# Patient Record
Sex: FEMALE | Race: WHITE | Employment: UNEMPLOYED | URBAN - NONMETROPOLITAN AREA
[De-identification: names, ages, dates, MRNs, and addresses within clinical notes are randomized per-mention and may not be internally consistent; named-entity substitution may affect disease eponyms.]

---

## 2017-03-03 ENCOUNTER — OFFICE VISIT - GICH (OUTPATIENT)
Dept: FAMILY MEDICINE | Facility: OTHER | Age: 52
End: 2017-03-03

## 2017-03-03 ENCOUNTER — HISTORY (OUTPATIENT)
Dept: FAMILY MEDICINE | Facility: OTHER | Age: 52
End: 2017-03-03

## 2017-03-03 DIAGNOSIS — R41.3 OTHER AMNESIA: ICD-10-CM

## 2017-03-03 DIAGNOSIS — Z11.59 ENCOUNTER FOR SCREENING FOR OTHER VIRAL DISEASES (CODE): ICD-10-CM

## 2017-03-03 DIAGNOSIS — Z00.00 ENCOUNTER FOR GENERAL ADULT MEDICAL EXAMINATION WITHOUT ABNORMAL FINDINGS: ICD-10-CM

## 2017-03-03 DIAGNOSIS — Z12.4 ENCOUNTER FOR SCREENING FOR MALIGNANT NEOPLASM OF CERVIX: ICD-10-CM

## 2017-03-03 DIAGNOSIS — Z23 ENCOUNTER FOR IMMUNIZATION: ICD-10-CM

## 2017-03-03 LAB
A/G RATIO - HISTORICAL: 1.4 (ref 1–2)
ABSOLUTE BASOPHILS - HISTORICAL: 0.1 THOU/CU MM
ABSOLUTE EOSINOPHILS - HISTORICAL: 0.3 THOU/CU MM
ABSOLUTE LYMPHOCYTES - HISTORICAL: 2 THOU/CU MM (ref 0.9–2.9)
ABSOLUTE MONOCYTES - HISTORICAL: 0.7 THOU/CU MM
ABSOLUTE NEUTROPHILS - HISTORICAL: 5.4 THOU/CU MM (ref 1.7–7)
ALBUMIN SERPL-MCNC: 4.4 G/DL (ref 3.5–5.7)
ALP SERPL-CCNC: 71 IU/L (ref 34–104)
ALT (SGPT) - HISTORICAL: 15 IU/L (ref 7–52)
ANION GAP - HISTORICAL: 10 (ref 5–18)
AST SERPL-CCNC: 20 IU/L (ref 13–39)
BASOPHILS # BLD AUTO: 1.5 %
BILIRUB SERPL-MCNC: 0.4 MG/DL (ref 0.3–1)
BUN SERPL-MCNC: 7 MG/DL (ref 7–25)
BUN/CREAT RATIO - HISTORICAL: 10
CALCIUM SERPL-MCNC: 9.6 MG/DL (ref 8.6–10.3)
CHLORIDE SERPLBLD-SCNC: 104 MMOL/L (ref 98–107)
CO2 SERPL-SCNC: 26 MMOL/L (ref 21–31)
CREAT SERPL-MCNC: 0.72 MG/DL (ref 0.7–1.3)
EOSINOPHIL NFR BLD AUTO: 4 %
ERYTHROCYTE [DISTWIDTH] IN BLOOD BY AUTOMATED COUNT: 13.8 % (ref 11.5–15.5)
FOLATE: 14.4 NG/ML
GFR IF NOT AFRICAN AMERICAN - HISTORICAL: >60 ML/MIN/1.73M2
GLOBULIN - HISTORICAL: 3.1 G/DL (ref 2–3.7)
GLUCOSE SERPL-MCNC: 91 MG/DL (ref 70–105)
HCT VFR BLD AUTO: 45.6 % (ref 33–51)
HEMOGLOBIN: 15.2 G/DL (ref 12–16)
HEPATITIS C ANTIBODY CATEGORY - HISTORICAL: NORMAL
LYMPHOCYTES NFR BLD AUTO: 23.6 % (ref 20–44)
MCH RBC QN AUTO: 30.8 PG (ref 26–34)
MCHC RBC AUTO-ENTMCNC: 33.3 G/DL (ref 32–36)
MCV RBC AUTO: 93 FL (ref 80–100)
MONOCYTES NFR BLD AUTO: 7.7 %
NEUTROPHILS NFR BLD AUTO: 63.2 % (ref 42–72)
PLATELET # BLD AUTO: 315 THOU/CU MM (ref 140–440)
PMV BLD: 6.5 FL (ref 6.5–11)
POTASSIUM SERPL-SCNC: 4 MMOL/L (ref 3.5–5.1)
PROT SERPL-MCNC: 7.5 G/DL (ref 6.4–8.9)
RED BLOOD COUNT - HISTORICAL: 4.93 MIL/CU MM (ref 4–5.2)
SODIUM SERPL-SCNC: 140 MMOL/L (ref 133–143)
TSH - HISTORICAL: 0.64 UIU/ML (ref 0.34–5.6)
VIT B12 SERPL-MCNC: 297 PG/ML (ref 180–914)
VITAMIN D TOTAL - HISTORICAL: 12.6 NG/ML
WHITE BLOOD COUNT - HISTORICAL: 8.6 THOU/CU MM (ref 4.5–11)

## 2017-03-06 ENCOUNTER — HOSPITAL ENCOUNTER (OUTPATIENT)
Dept: RADIOLOGY | Facility: OTHER | Age: 52
End: 2017-03-06
Attending: FAMILY MEDICINE

## 2017-03-06 DIAGNOSIS — R41.3 OTHER AMNESIA: ICD-10-CM

## 2017-03-14 ENCOUNTER — OFFICE VISIT - GICH (OUTPATIENT)
Dept: FAMILY MEDICINE | Facility: OTHER | Age: 52
End: 2017-03-14

## 2017-03-14 ENCOUNTER — HISTORY (OUTPATIENT)
Dept: FAMILY MEDICINE | Facility: OTHER | Age: 52
End: 2017-03-14

## 2017-03-14 DIAGNOSIS — E55.9 VITAMIN D DEFICIENCY: ICD-10-CM

## 2017-04-14 ENCOUNTER — COMMUNICATION - GICH (OUTPATIENT)
Dept: FAMILY MEDICINE | Facility: OTHER | Age: 52
End: 2017-04-14

## 2017-04-14 DIAGNOSIS — E55.9 VITAMIN D DEFICIENCY: ICD-10-CM

## 2018-01-03 NOTE — PROGRESS NOTES
Patient Information     Patient Name MRN Sex Alondra Urbano [id number] Female 1965      Progress Notes by Mireille Ferreira MD at 3/14/2017  3:30 PM     Author:  Mireille Ferreira MD Service:  (none) Author Type:  Physician     Filed:  3/16/2017 10:33 AM Encounter Date:  3/14/2017 Status:  Signed     :  Mireille Ferreira MD (Physician)            SUBJECTIVE:    Alondra Farrar is a 51 y.o. female who presents for follow-up of recent results    HPI Comments: She was concerned about change in memory and due to previous head trauma, proceeded with CT scan which was normal.   She remains sober, last drink > 3months ago        No Known Allergies and   Current Outpatient Prescriptions on File Prior to Visit       Medication  Sig Dispense Refill     brimonidine (ALPHAGAN) 0.2 % ophthalmic solution 1 Drop 3 times daily. 5 mL 0     cyclopentolate (CYCLOGYL) 1 % ophthalmic solution 1 Drop one time for 1 dose. 1 Bottle 0     timolol maleate (TIMOPTIC) 0.5 % ophthalmic solution 2 times daily. 5 mL 0     No current facility-administered medications on file prior to visit.        REVIEW OF SYSTEMS:  Review of Systems   Constitutional: Negative for chills, fever and weight loss.   HENT: Negative for hearing loss.    Respiratory: Negative for cough.    Cardiovascular: Negative for chest pain and palpitations.   Gastrointestinal: Negative for heartburn, nausea and vomiting.   Skin: Negative for rash.   Neurological: Negative for headaches.   Psychiatric/Behavioral: Negative for depression and substance abuse.       OBJECTIVE:  /76  Pulse 84  Wt 64 kg (141 lb 3.2 oz)  BMI 23.5 kg/m2    EXAM:   Physical Exam   Constitutional: She is well-developed, well-nourished, and in no distress.   Neck: No thyromegaly present.   Lymphadenopathy:     She has no cervical adenopathy.   Neurological: She is alert. She has normal reflexes. She displays normal reflexes. No cranial nerve deficit. She exhibits  normal muscle tone. Gait normal. Coordination normal.   Skin: No rash noted.   Psychiatric: Affect normal.       ASSESSMENT/PLAN:    ICD-10-CM    1. Vitamin D deficiency E55.9 ergocalciferol (VITAMIN D2) 50,000 unit capsule      2. Vit B12 deficiency  3. Alcoholism  Plan:  Reviewed CT scan which was normal. Needs to remain sober, will start vitamin supplements as detailed below  Patient Instructions   womens multivitamin daily  Vit B complex daily    Start Vit D 50,000 units weekly x 6 weeks then return for repeat lab, if normal then will switch to Vit D 2000 units daily    Head CT was normal    Live function normal  Total of 25 minutes was spent with patient in counseling and coordination of care.  Mireille Griffin MD  10:33 AM 3/16/2017

## 2018-01-03 NOTE — NURSING NOTE
Patient Information     Patient Name MRN Alondra Conley [id number] Female 1965      Nursing Note by Monica Royal at 3/14/2017  3:30 PM     Author:  Monica Royal Service:  (none) Author Type:  (none)     Filed:  3/14/2017  3:57 PM Encounter Date:  3/14/2017 Status:  Signed     :  Monica Royal            Patient is here for follow up of ct results,.  Monica Royal LPN .............3/14/2017  3:38 PM

## 2018-01-03 NOTE — NURSING NOTE
Patient Information     Patient Name MRN Alondra Conley [id number] Female 1965      Nursing Note by Monica Royal at 3/3/2017  8:15 AM     Author:  Monica Royal Service:  (none) Author Type:  (none)     Filed:  3/3/2017  8:55 AM Encounter Date:  3/3/2017 Status:  Signed     :  Monica Royal            Patient is here for physical, states has been since , hasn't had a regular doctor. Patient has concerns of multiple concussions over the last year. States having memory issues, itching at head, and bumps on head.  Monica Royal LPN .............3/3/2017  8:33 AM

## 2018-01-03 NOTE — PROGRESS NOTES
Patient Information     Patient Name MRN Sex Alondra Urbano [id number] Female 1965      Progress Notes by Beckie Medeiros at 3/6/2017  1:14 PM     Author:  Beckie Medeiros Service:  (none) Author Type:  Other Clinical Staff     Filed:  3/6/2017  1:14 PM Date of Service:  3/6/2017  1:14 PM Status:  Signed     :  Beckie Medeiros (Other Clinical Staff)            Falls Risk Criteria:    Age 65 and older or under age 4        Sensory deficits    Poor vision    Use of ambulatory aides    Impaired judgment    Unable to walk independently    Meets High Risk criteria for falls:  no

## 2018-01-03 NOTE — PATIENT INSTRUCTIONS
Patient Information     Patient Name MRN Sex Alondra Urbano [id number] Female 1965      Patient Instructions by Mireille Ferreira MD at 3/3/2017  8:15 AM     Author:  Mireille Ferreira MD Service:  (none) Author Type:  Physician     Filed:  3/3/2017  9:10 AM Encounter Date:  3/3/2017 Status:  Signed     :  Mireille Ferreira MD (Physician)            Schedule head ct scan  Check labs   reconsider mammogram   Tdap shot today  Please follow-up 1 week after Ct scan to review results

## 2018-01-03 NOTE — PROGRESS NOTES
Patient Information     Patient Name MRN Alondra Conley [id number] Female 1965      Progress Notes by Mireille Ferreira MD at 3/3/2017  8:15 AM     Author:  Mireille Ferreira MD Service:  (none) Author Type:  Physician     Filed:  3/7/2017 12:33 PM Encounter Date:  3/3/2017 Status:  Signed     :  Mireille Ferreira MD (Physician)            ANNUAL EXAM ADULT FEMALE - GYN    Alondra Farrar is a 51 y.o. female, G 4, P 4, here today for her annual gynecologic exam.  HPI:  Presents for annual exam. She has some concerns about poor memory since going through menopause. She reports previous head trauma when she was drinking heavily. She has been sober x 6 months.   No abnormal pap smears, no pelvic pain, not sexually active      CURRENT MEDICATIONS:  Current Outpatient Prescriptions       Medication  Sig Dispense Refill     brimonidine (ALPHAGAN) 0.2 % ophthalmic solution 1 Drop 3 times daily. 5 mL 0     cyclopentolate (CYCLOGYL) 1 % ophthalmic solution 1 Drop one time for 1 dose. 1 Bottle 0     timolol maleate (TIMOPTIC) 0.5 % ophthalmic solution 2 times daily. 5 mL 0     No current facility-administered medications for this visit.      Medications have been reviewed by me and are current to the best of my knowledge and ability.      ALLERGIES:  Review of patient's allergies indicates no known allergies.     Past Medical History      Diagnosis   Date     NVD (normal vaginal delivery)         x 4        Past Surgical History       Procedure   Laterality Date     Cataract removal        Cataract surgery as a teen       Tubal ligation        Appendectomy          SOCIAL HISTORY:  Social History     Social History        Marital status:       Spouse name: N/A     Number of children:  N/A     Years of education:  N/A     Occupational History      Not on file.     Social History Main Topics          Smoking status:   Current Every Day Smoker      Packs/day:  1.00       "Types:  Cigarettes      Smokeless tobacco:   Never Used      Alcohol use   Yes      Comment: occ       Drug use:   No      Sexual activity:   Yes      Other Topics  Concern     Not on file      Social History Narrative     Marital Status:     Children: 4 children    Occupation:                    Family History       Problem   Relation Age of Onset     Alcohol/Drug  Mother        age 58 due to cirrhosis       Heart Disease  Father      MI in his 60's       Good Health  Other      Siblings healthy         RISK FACTORS  Social History        Substance Use Topics          Smoking status:   Current Every Day Smoker      Packs/day:  1.00      Types:  Cigarettes      Smokeless tobacco:   Never Used      Alcohol use   Yes      Comment: occ        Exercise Times/wk: 3  Seat Belt Use: 100%  Birth Control Method: none  High Risk/Behavior: none    PREVENTIVE SERVICES  Preventive services were reviewed today, 2017.    LMP: No LMP recorded. Patient is postmenopausal.  Menstrual Regularity: not applicable  Flow: not applicable    ROS  Negative for Review of Systems not covered in the HPI.    PHYSICAL EXAM  /70  Pulse 88  Ht 1.651 m (5' 5\")  Wt 61.1 kg (134 lb 12.8 oz)  BMI 22.43 kg/m2  General Appearance:  Alert, appropriate appearance for age. No acute distress  HEENT Exam:  Grossly normal.  Neck / Thyroid Exam:  Supple, no masses, nodes or enlargement.  Chest/Respiratory Exam: Normal chest wall and respirations. Clear to auscultation.  Breast Exam: No dimpling, nipple retraction or discharge. No masses or nodes.  Cardiovascular Exam: Regular rate and rhythm. S1, S2, no murmur, click, gallop, or rubs.  Gastrointestinal Exam: Soft, non-tender, no masses or organomegaly.  Pelvic Exam Female: Vulva and vagina appear normal. Bimanual exam reveals normal uterus and adnexa. Clinical staff offered to be present for exam: .   Lymphatic Exam: Non-palpable nodes in neck, clavicular, axillary, or " inguinal regions.  Musculoskeletal Exam: Back is straight and non-tender, full ROM of upper and lower extremities.  Skin: no rash or abnormalities  Neurologic Exam: Normal gait and speech, no tremor.  Psychiatric Exam: Alert and oriented, appropriate affect.  Clinical staff offered to be present for exam: yes, pt declined.  PHQ Depression Screening 3/3/2017   Date of PHQ exam (doc flow) 3/3/2017   1. Lack of interest/pleasure 0 - Not at all   2. Feeling down/depressed 0 - Not at all   PHQ-2 TOTAL SCORE 0       ASSESSMENT/PLAN    ICD-10-CM    1. Annual physical exam Z00.00    2. Need for hepatitis C screening test Z11.59 ANTI HCV      ANTI HCV   3. Memory loss R41.3 CT HEAD BRAIN WO      VITAMIN B12      TSH      CBC AND DIFFERENTIAL      VITAMIN D 25 (DEFICIENCY)      COMP METABOLIC PANEL      FOLIC ACID      VITAMIN B12      TSH      CBC AND DIFFERENTIAL      VITAMIN D 25 (DEFICIENCY)      COMP METABOLIC PANEL      FOLIC ACID      CBC WITH AUTO DIFFERENTIAL   4. Cervical cancer screening Z12.4 GYN THIN PREP PAP SCREEN IMAGED      GYN THIN PREP PAP SCREEN IMAGED   5. Need for Tdap vaccination Z23 TDAP VACCINE IM      DC ADMIN VACC INITIAL     Ms. Marrero Body mass index is 22.43 kg/(m^2). This is within the normal range for a 51 y.o. Normal range for ages 18-64 is between 18.5 and 24.9; normal range for ages 65+ is 23-30.   BP Readings from Last 1 Encounters:03/03/17 : 130/70  Ms. Marrero blood pressure is out of the normal range for adults. Per JNC-8 guidelines normal adult blood pressure is < 120/80, pre-hypertensive is between 120/80 and 139/89, and hypertension is 140/90 or greater. Risks of hypertension were discussed. Patient's strategy will be weight loss and increased activity  Patient is counseled on importance of regular self breast exams, annual mammogram starting at the age of 40. Patient may go to every 3 years for screening Pap smears, should continue annual pelvic exams. Discussed the importance  of calcium and vitamin D supplementation and screening for osteoporosis. Immunizations are current. Pap smear was performed today and patient will receive a letter with results. Reviewed the importance of sunscreen, seatbelt and helmet use.    Given reported head trauma, will proceed with head CT scan, also check for metabolic causes, suspect mostly related to long term alcoholism    Patient Instructions   Schedule head ct scan  Check labs   reconsider mammogram   Tdap shot today  Please follow-up 1 week after Ct scan to review results  Mireille Griffin MD  12:29 PM 3/7/2017

## 2018-01-03 NOTE — PATIENT INSTRUCTIONS
Patient Information     Patient Name MRN Sex Alondra Urbano [id number] Female 1965      Patient Instructions by Mireille Ferreira MD at 3/14/2017  3:30 PM     Author:  Mireille Ferreira MD Service:  (none) Author Type:  Physician     Filed:  3/14/2017  4:00 PM Encounter Date:  3/14/2017 Status:  Signed     :  Mireille Ferreira MD (Physician)            womens multivitamin daily  Vit B complex daily    Start Vit D 50,000 units weekly x 6 weeks then return for repeat lab, if normal then will switch to Vit D 2000 units daily    Head CT was normal    Live function normal

## 2018-01-04 NOTE — TELEPHONE ENCOUNTER
Patient Information     Patient Name MRN Alondra Conley [id number] Female 1965      Telephone Encounter by Judy Harding RN at 2017 10:47 AM     Author:  Judy Harding RN Service:  (none) Author Type:  NURS- Registered Nurse     Filed:  2017 10:58 AM Encounter Date:  2017 Status:  Signed     :  Judy Harding RN (NURS- Registered Nurse)            Refill request denied. Patient was to take this medication for 6 weeks, then return for follow up labs and possible switch to lower daily dose.    Attempted to notify patient, but phone number listed is not active. Pharmacy was contacted and asked to notify patient that she is due for appointment if she calls them. They verbalized understanding and intent to follow through. Fliqq message was also sent to patient.    Judy Harding RN........2017 10:49 AM

## 2018-01-26 ENCOUNTER — DOCUMENTATION ONLY (OUTPATIENT)
Dept: FAMILY MEDICINE | Facility: OTHER | Age: 53
End: 2018-01-26

## 2018-01-26 RX ORDER — ERGOCALCIFEROL 1.25 MG/1
1 CAPSULE, LIQUID FILLED ORAL WEEKLY
COMMUNITY
Start: 2017-03-14 | End: 2018-07-23

## 2018-01-26 RX ORDER — BRIMONIDINE TARTRATE 2 MG/ML
1 SOLUTION/ DROPS OPHTHALMIC 3 TIMES DAILY
COMMUNITY
Start: 2017-03-03

## 2018-01-26 RX ORDER — TIMOLOL MALEATE 5 MG/ML
SOLUTION/ DROPS OPHTHALMIC
COMMUNITY
Start: 2017-03-03

## 2018-01-26 RX ORDER — CYCLOPENTOLATE HYDROCHLORIDE 10 MG/ML
1 SOLUTION/ DROPS OPHTHALMIC
COMMUNITY
Start: 2017-03-03

## 2018-01-27 VITALS
WEIGHT: 141.2 LBS | DIASTOLIC BLOOD PRESSURE: 76 MMHG | HEART RATE: 84 BPM | BODY MASS INDEX: 23.5 KG/M2 | SYSTOLIC BLOOD PRESSURE: 120 MMHG

## 2018-01-27 VITALS
WEIGHT: 134.8 LBS | BODY MASS INDEX: 22.46 KG/M2 | DIASTOLIC BLOOD PRESSURE: 70 MMHG | HEART RATE: 88 BPM | HEIGHT: 65 IN | SYSTOLIC BLOOD PRESSURE: 130 MMHG

## 2018-04-17 ENCOUNTER — RESULTS ONLY (OUTPATIENT)
Dept: LAB | Age: 53
End: 2018-04-17

## 2018-04-17 ENCOUNTER — HOSPITAL ENCOUNTER (OUTPATIENT)
Dept: GENERAL RADIOLOGY | Facility: OTHER | Age: 53
Discharge: HOME | End: 2018-04-17
Attending: NURSE PRACTITIONER | Admitting: NURSE PRACTITIONER

## 2018-04-17 ENCOUNTER — APPOINTMENT (OUTPATIENT)
Dept: LAB | Facility: OTHER | Age: 53
End: 2018-04-17
Attending: FAMILY MEDICINE

## 2018-04-17 DIAGNOSIS — R05.9 COUGH: ICD-10-CM

## 2018-04-17 DIAGNOSIS — R50.9 FEVER: ICD-10-CM

## 2018-04-17 LAB
BASOPHILS # BLD AUTO: 0.1 10E9/L (ref 0–0.2)
BASOPHILS NFR BLD AUTO: 0.4 %
DIFFERENTIAL METHOD BLD: ABNORMAL
EOSINOPHIL # BLD AUTO: 0.1 10E9/L (ref 0–0.7)
EOSINOPHIL NFR BLD AUTO: 0.8 %
ERYTHROCYTE [DISTWIDTH] IN BLOOD BY AUTOMATED COUNT: 14.1 % (ref 10–15)
HCT VFR BLD AUTO: 41.1 % (ref 35–47)
HGB BLD-MCNC: 14.2 G/DL (ref 11.7–15.7)
IMM GRANULOCYTES # BLD: 0.1 10E9/L (ref 0–0.4)
IMM GRANULOCYTES NFR BLD: 0.4 %
LYMPHOCYTES # BLD AUTO: 2.8 10E9/L (ref 0.8–5.3)
LYMPHOCYTES NFR BLD AUTO: 19.3 %
MCH RBC QN AUTO: 31.7 PG (ref 26.5–33)
MCHC RBC AUTO-ENTMCNC: 34.5 G/DL (ref 31.5–36.5)
MCV RBC AUTO: 92 FL (ref 78–100)
MONOCYTES # BLD AUTO: 1 10E9/L (ref 0–1.3)
MONOCYTES NFR BLD AUTO: 6.6 %
NEUTROPHILS # BLD AUTO: 10.4 10E9/L (ref 1.6–8.3)
NEUTROPHILS NFR BLD AUTO: 72.5 %
PLATELET # BLD AUTO: 245 10E9/L (ref 150–450)
RBC # BLD AUTO: 4.48 10E12/L (ref 3.8–5.2)
WBC # BLD AUTO: 14.4 10E9/L (ref 4–11)

## 2018-04-17 PROCEDURE — 71046 X-RAY EXAM CHEST 2 VIEWS: CPT

## 2018-07-23 ENCOUNTER — OFFICE VISIT (OUTPATIENT)
Dept: FAMILY MEDICINE | Facility: OTHER | Age: 53
End: 2018-07-23
Payer: COMMERCIAL

## 2018-07-23 VITALS
SYSTOLIC BLOOD PRESSURE: 130 MMHG | WEIGHT: 125 LBS | DIASTOLIC BLOOD PRESSURE: 80 MMHG | BODY MASS INDEX: 20.8 KG/M2 | HEART RATE: 80 BPM

## 2018-07-23 DIAGNOSIS — Z13.220 SCREENING CHOLESTEROL LEVEL: ICD-10-CM

## 2018-07-23 DIAGNOSIS — R53.83 FATIGUE, UNSPECIFIED TYPE: ICD-10-CM

## 2018-07-23 DIAGNOSIS — Z00.00 ROUTINE HISTORY AND PHYSICAL EXAMINATION OF ADULT: Primary | ICD-10-CM

## 2018-07-23 DIAGNOSIS — M54.50 ACUTE BILATERAL LOW BACK PAIN WITHOUT SCIATICA: ICD-10-CM

## 2018-07-23 DIAGNOSIS — Z72.0 TOBACCO ABUSE: ICD-10-CM

## 2018-07-23 LAB
ALBUMIN UR-MCNC: NEGATIVE MG/DL
ANION GAP SERPL CALCULATED.3IONS-SCNC: 7 MMOL/L (ref 3–14)
APPEARANCE UR: CLEAR
BASOPHILS # BLD AUTO: 0.1 10E9/L (ref 0–0.2)
BASOPHILS NFR BLD AUTO: 1.1 %
BILIRUB UR QL STRIP: NEGATIVE
BUN SERPL-MCNC: 10 MG/DL (ref 7–25)
CALCIUM SERPL-MCNC: 9.7 MG/DL (ref 8.6–10.3)
CHLORIDE SERPL-SCNC: 104 MMOL/L (ref 98–107)
CHOLEST SERPL-MCNC: 251 MG/DL
CO2 SERPL-SCNC: 28 MMOL/L (ref 21–31)
COLOR UR AUTO: YELLOW
CREAT SERPL-MCNC: 0.7 MG/DL (ref 0.6–1.2)
DEPRECATED CALCIDIOL+CALCIFEROL SERPL-MC: 49.4 NG/ML
DIFFERENTIAL METHOD BLD: NORMAL
EOSINOPHIL # BLD AUTO: 0.5 10E9/L (ref 0–0.7)
EOSINOPHIL NFR BLD AUTO: 6.6 %
ERYTHROCYTE [DISTWIDTH] IN BLOOD BY AUTOMATED COUNT: 13.5 % (ref 10–15)
GFR SERPL CREATININE-BSD FRML MDRD: 88 ML/MIN/1.7M2
GLUCOSE SERPL-MCNC: 73 MG/DL (ref 70–105)
GLUCOSE UR STRIP-MCNC: NEGATIVE MG/DL
HCT VFR BLD AUTO: 41 % (ref 35–47)
HDLC SERPL-MCNC: 78 MG/DL (ref 23–92)
HGB BLD-MCNC: 14.1 G/DL (ref 11.7–15.7)
HGB UR QL STRIP: NEGATIVE
IMM GRANULOCYTES # BLD: 0 10E9/L (ref 0–0.4)
IMM GRANULOCYTES NFR BLD: 0.1 %
KETONES UR STRIP-MCNC: NEGATIVE MG/DL
LDLC SERPL CALC-MCNC: 143 MG/DL
LEUKOCYTE ESTERASE UR QL STRIP: NEGATIVE
LYMPHOCYTES # BLD AUTO: 3.3 10E9/L (ref 0.8–5.3)
LYMPHOCYTES NFR BLD AUTO: 44.8 %
MCH RBC QN AUTO: 32 PG (ref 26.5–33)
MCHC RBC AUTO-ENTMCNC: 34.4 G/DL (ref 31.5–36.5)
MCV RBC AUTO: 93 FL (ref 78–100)
MONOCYTES # BLD AUTO: 0.7 10E9/L (ref 0–1.3)
MONOCYTES NFR BLD AUTO: 9.3 %
NEUTROPHILS # BLD AUTO: 2.8 10E9/L (ref 1.6–8.3)
NEUTROPHILS NFR BLD AUTO: 38.1 %
NITRATE UR QL: NEGATIVE
NONHDLC SERPL-MCNC: 173 MG/DL
PH UR STRIP: 6.5 PH (ref 5–9)
PLATELET # BLD AUTO: 244 10E9/L (ref 150–450)
POTASSIUM SERPL-SCNC: 3.7 MMOL/L (ref 3.5–5.1)
RBC # BLD AUTO: 4.41 10E12/L (ref 3.8–5.2)
SODIUM SERPL-SCNC: 139 MMOL/L (ref 134–144)
SOURCE: NORMAL
SP GR UR STRIP: <1.005 (ref 1–1.03)
TRIGL SERPL-MCNC: 151 MG/DL
TSH SERPL DL<=0.05 MIU/L-ACNC: 0.5 IU/ML (ref 0.34–5.6)
UROBILINOGEN UR STRIP-ACNC: 0.2 EU/DL (ref 0.2–1)
WBC # BLD AUTO: 7.3 10E9/L (ref 4–11)

## 2018-07-23 PROCEDURE — 36415 COLL VENOUS BLD VENIPUNCTURE: CPT | Performed by: PHYSICIAN ASSISTANT

## 2018-07-23 PROCEDURE — 80061 LIPID PANEL: CPT | Performed by: PHYSICIAN ASSISTANT

## 2018-07-23 PROCEDURE — 82306 VITAMIN D 25 HYDROXY: CPT | Performed by: PHYSICIAN ASSISTANT

## 2018-07-23 PROCEDURE — 84443 ASSAY THYROID STIM HORMONE: CPT | Performed by: PHYSICIAN ASSISTANT

## 2018-07-23 PROCEDURE — 99396 PREV VISIT EST AGE 40-64: CPT | Performed by: PHYSICIAN ASSISTANT

## 2018-07-23 PROCEDURE — 86618 LYME DISEASE ANTIBODY: CPT | Performed by: PHYSICIAN ASSISTANT

## 2018-07-23 PROCEDURE — 87798 DETECT AGENT NOS DNA AMP: CPT | Mod: 91 | Performed by: PHYSICIAN ASSISTANT

## 2018-07-23 PROCEDURE — 85025 COMPLETE CBC W/AUTO DIFF WBC: CPT | Performed by: PHYSICIAN ASSISTANT

## 2018-07-23 PROCEDURE — G0463 HOSPITAL OUTPT CLINIC VISIT: HCPCS

## 2018-07-23 PROCEDURE — 80048 BASIC METABOLIC PNL TOTAL CA: CPT | Performed by: PHYSICIAN ASSISTANT

## 2018-07-23 PROCEDURE — 81003 URINALYSIS AUTO W/O SCOPE: CPT | Performed by: PHYSICIAN ASSISTANT

## 2018-07-23 RX ORDER — CHOLECALCIFEROL (VITAMIN D3) 125 MCG
1000 TABLET ORAL
COMMUNITY
Start: 2018-05-10 | End: 2019-12-02

## 2018-07-23 RX ORDER — BUPROPION HYDROCHLORIDE 150 MG/1
TABLET, EXTENDED RELEASE ORAL
Qty: 60 TABLET | Refills: 2 | Status: SHIPPED | OUTPATIENT
Start: 2018-07-23 | End: 2019-11-08

## 2018-07-23 NOTE — MR AVS SNAPSHOT
"              After Visit Summary   7/23/2018    Alondra Farrar    MRN: [id number]           Patient Information     Date Of Birth          1965        Visit Information        Provider Department      7/23/2018 3:15 PM Tami Sandoval PA-C [facility]        Today's Diagnoses     Routine history and physical examination of adult    -  1    Fatigue, unspecified type        Tobacco abuse        Screening cholesterol level          Care Instructions    Healthy Strategies  1. Eat at least 3 meals a day and never skip breakfast.  2. Eat more slowly.  3. Decrease portion size.  4. Provide structure by using meal replacement bars or shakes, and/or low calorie frozenmeals.  5. For good nutrition incorporate fruit, vegetables, whole grains, lean protein, and low-fat dairy.  6. Remove trigger foods from yourenvironment to avoid impulse eating.  7. Increase physical activity: get a pedometer and aim for 10,000 steps a day or 30-35 minutes of activity 5 days per week.  8. Weigh yourself daily or at least weekly.  9. Keep a record of what you eat and your activity.  10. Establish a support system such as afriend, group or program.    11. Read Luis Tinajero's \"Eat to Live\". Remember it is important to have a minimum of 1200 calories a day, okay to use olive oil, 40 grams of fiber daily. No more than two servings (the size of your palm) of red meat a week.     Please consider the following general health recommendations:    Schedule a mammogram annually starting at the age of 40 years old unless recommended earlier by your primary care provider. Come for a general exam once yearly.    Eat a quality diet (generally, low in simple sugars, starches, cholesterol and saturated fat.)    Please get 1500 mg of calcium in divided doses with 1500 units vitamin D in your diet daily. Take supplements as needed to obtain full recommended amounts.     Stay physically active. Regular walking or other exercise is " one of the best ways to minimize pain of arthritis; maintain independence and mobility; maintain bone strength; maintain conditioning of your heart. Find something you enjoy and a friend to do it with you.    Maintain ideal weight. Your Body mass index is Body mass index is 20.8 kg/(m^2).. Generally a BMI of 20-25 is considered ideal. Overweight is defined as 25-30, obese is 30-35 and markedly obese is greater than 35.    Apply sun block (SPF 25 or greater) on exposed skin anytime you are out in the sun to prevent skin cancer.     Wear a seatbelt whenever you are in a car.    Consider a bone density study every 2-5 years to see if you are at increased risk for fracture. If you have osteoporosis, medicine to strengthen your bones can significantly reduce your risk of fracture, back pain, and loss of height.    Colonoscopy (an exam of the colon) is recommended every 10 years after the age of 50 to screen for colon cancer. (More often if you are at increased risk.)    Obtain a flu shot every fall.    Receive a pneumonia shot series after the age of 65 (repeat in 5 years if you have other risk factors). This does not prevent all types of pneumonia, but reduces the risk of the worst bacterial causes of pneumonia.    You should have a tetanus booster at least once every 10 years.    A vaccine to reduce your chances of getting shingles is available if you are over 50. Information about the vaccine is available through the clinic or at:  (http://www.nlm.nih.gov/medlineplus/druginfo/medmaster/m587810.html)    Check blood sugar annually. Cholesterol annually unless you have had a normal level when last checked within 5 years.     I recommend that you have a general physical exam every year. You should have a pap test every 3 years between the ages of 21 and 30 and every 3-5 years between the ages of 30 and 65 depending on your test unless you have had previous abnormal pap smears, (in these cases the exams and PAP's should be  done on a schedule as recommended by your primary care provider). If you have had hysterectomy in the past, your future Pap plan may be different.       How to Quit Smoking  Smoking is one of the hardest habits to break. About half of all people who have ever smoked have been able to quit. Most people who still smoke want to quit. Here are some of the best ways to stop smoking.    Keep trying  Most smokers make many attempts at quitting before they are successful. It s important not to give up.  Go cold turkey  Most former smokers quit cold turkey (all at once). Trying to cut back gradually doesn't seem to work as well, perhaps because it continues the smoking habit. Also, it is possible to inhale more while smoking fewer cigarettes. This results in the same amount of nicotine in your body.  Get support  Support programs can be a big help, especially for heavy smokers. These groups offer lectures, ways to change behavior, and peer support. Here are some ways to find a support program:    Free national quitline: 800-QUIT-NOW (190-167-2964).    [facility] quit-smoking programs.    American Lung Association: (729.150.7035).    American Cancer Society (652-715-1792).  Support at home is important too. Nonsmokers can offer praise and encouragement. If the smoker in your life finds it hard to quit, encourage them to keep trying.  Over-the-counter medicines  Nicotine replacement therapy may make quitting easier. Certain aids, such as the nicotine patch, gum, and lozenges, are available without a prescription. It is best to use these under a doctor s care, though. The skin patch provides a steady supply of nicotine. Nicotine gum and lozenges give temporary bursts of low levels of nicotine. Both methods reduce the craving for cigarettes. Warning: If you have nausea, vomiting, dizziness, weakness, or a fast heartbeat, stop using these products and see your doctor.  Prescription medicines  After reviewing your smoking patterns  "and past attempts to quit, your doctor may offer a prescription medicine such as bupropion, varenicline, a nicotine inhaler, or nasal spray. Each has advantages and side effects. Your doctor can review these with you.  Health benefits of quitting  The benefits of quitting start right away and keep improving the longer you go without smoking. These benefits occur at any age.  So whether you are 17 or 70, quitting is a good decision. Some of the benefits include:    20 minutes: Blood pressure and pulse return to normal.    8 hours: Oxygen levels return to normal.    2 days: Ability to smell and taste begin to improve as damaged nerves regrow.    2 to 3 weeks: Circulation and lung function improve.    1 to 9 months: Coughing, congestion, and shortness of breath decrease; tiredness decreases.    1 year: Risk of heart attack decreases by half.    5 years: Risk of lung cancer decreases by half; risk of stroke becomes the same as a nonsmoker s.  For more on how to quit smoking, try these online resources:     SmokefrVivakor.gov    \"Clearing the Air\" booklet from the National Cancer [place]: smokefree.gov/sites/default/files/pdf/clearing-the-air-accessible.pdf  Date Last Reviewed: 3/1/2017    3286-8947 [employer]. [address] [place] [address], [place] [address]. All rights reserved. This information is not intended as a substitute for professional medical care. Always follow your healthcare professional's instructions.        Coping with Smoking Withdrawal    For the first few days after you quit smoking, you may feel cranky, restless, depressed, or low on energy. These are symptoms of withdrawal. Your body needs time to recover from smoking. Your symptoms should lessen within a few days.  Coping with the urge to smoke    Deep-breathe. Inhale through your nose. Count to 5. Slowly exhale through your mouth.    Drink water. Try to drink 8 or more 8-ounce glasses of water a day.    Keep your hands busy. Wash your car. " Draw. Do a puzzle. Build a birdhouse.    Delay. The urge to smoke lasts only 3 to 5 minutes.  Get support  Individual, group, and telephone counseling can help keep you on track. Ask your healthcare provider for more information about resources available to you.  Control stress  After you quit, you may feel irritable and stressed. Try taking a warm bath or shower. Listen to music. Go for a walk or to the gym. Try yoga or meditate. Call friends or talk with a professional.  Exercise  Exercise helps your body and mind feel better. There are many ways to be more active. Find something you enjoy doing. See if a friend will join you for a walk or a bike ride.  Sleep better  You may feel tired but have trouble falling asleep. Try to relax before bed. Do a few stretching exercises. Read for a while. Also, avoid caffeine for at least a few hours before bedtime.  Get fit, not fat  You may notice an increased appetite. Many people who quit smoking gain a few pounds. To limit weight gain, try to watch what you eat. Cut back on fat in your diet. Snack on low-calorie foods, like fresh fruits and vegetables. Drink low-calorie liquids, especially water. Regular exercise can also help you stay fit. And remember: Your main goal is to be a nonsmoker. Stay focused on that goal.  Quit-smoking products  There are a number of products that can help you quit smoking including medicines and nicotine replacement products. They are available over-the-counter or by prescription. Ask your healthcare provider if any of these could help you quit smoking.        For more information    https://smokefree.gov/vsoh-cx-uh-expert    National Cancer [place] Smoking Quitline: 877-44U-QUIT (443-802-5271)   Date Last Reviewed: 2/1/2017 2000-2017 [employer]. [address] [place] [address], [place] [address]. All rights reserved. This information is not intended as a substitute for professional medical care. Always follow your healthcare  professional's instructions.        Getting Support for Quitting Smoking  You don t have to go through the process of quitting smoking without support. Tell people you are quitting. The support of friends, coworkers, and family members can make a big difference. Face-to-face or telephone counseling can also be helpful, as can a stop-smoking class or an ex-smokers  group.    Set a quit date  If you re serious about quitting smoking, choose a date within the next 2 to 4 weeks. John it in bright, bold letters on a calendar you use often. Tell people about your quit date. Ask for their support. Let your friends and family know how they can help you quit.  Make a contract  A quit-smoking contract gives you a goal. Write out the contract and sign it. Have it witnessed, if you like. Then keep the contract where you ll see it often, or carry it with you. Read the contract when you re tempted to smoke.  Take action  On the day you quit, reread your quit contract. Think about the benefits you gain by quitting, such as better health and an improved sense of taste.    Remove cigarettes from your home, car, or any other place where you stash them.    Throw away all smoking materials, including matches, lighters, and ashtrays.    Review your list of triggers and your plan for coping with them.    Stay away from people or settings you link with smoking.    Make a survival kit that includes gum, mints, carrot sticks, and things to keep your hands busy.    Talk to your healthcare provider about using quit-smoking products, such as medication or a nicotine patch, inhaler, nasal spray, gum, or lozenges.  Ask for help  Sometimes you may just need to talk when you miss smoking. Ex-smokers are good to talk to, because they re likely to know how you feel. You may need extra support in the first few weeks after you quit. Ask a friend to call you each day to see how you re doing. Telephone counseling can also help you keep on track. Ask  your healthcare provider, [facility], or [facility] health department to put you in touch with a phone counselor. You may also have to deal with doubters when you decide to quit. Explain to any doubters why you are quitting. Tell them that quitting is important to you. Ask for their support. Tell your smoking buddies that you can walk together instead of smoking together. If someone thinks you won t succeed, say that you have a good quit plan and ask for their support. Let him or her know you re sticking with it.     For more information    https://smokefree.gov/uwgk-cm-rt-expert    National Cancer [place] Smoking Quitline: 877-44U-QUIT (352-259-6093)   Date Last Reviewed: 2/1/2017 2000-2017 [employer]. [address] [place] [address], [place] [address]. All rights reserved. This information is not intended as a substitute for professional medical care. Always follow your healthcare professional's instructions.        Planning to Quit Smoking  Your healthcare provider may have told you that you need to give up tobacco. Only you can decide if and when you are ready to quit. Quitting is hard to do. But the benefits will be worth it. When you  decide to quit, come up with a plan that s right for you. Discuss your plan with your healthcare provider. And talk to your provider about medicines to help you quit.    Line up support  To quit smoking, you ll need a plan and some help. Pick a date within the next 2 to 4 weeks to quit. Use the time between now and that date to arrange for support.    Classes and counselors. Quit-smoking classes  people like you through the process. Get to know others in a class, and support each other beyond the class. Telephone counseling also helps you keep on track. Ask your healthcare provider, [facility], or public health department to put you in touch with a class and a phone counselor.    Family and friends. Tell your family and friends about your quit date. Ask them  to support your change. If they smoke, arrange to see them in smoke-free places. Forbid smoking in your home and car.     Finding something to replace cigarettes may be hard to do. Be aware that some things you choose may be as harmful as cigarettes:    Smokeless (chewing) tobacco is just as harmful as regular tobacco. Tobacco should not be used as a substitute for cigarettes.    Herbal medicines or teas may affect how your body handles nicotine. Talk to your healthcare provider before using these products.    E-cigarettes have less toxins than the smoke from a regular cigarette. But the FDA says that these devices may still have substances that can cause cancer. E-cigarettes are not well regulated. They have not been studied enough to know if they are a good aid to help you stop smoking. Talk with your healthcare provider before using these products.      Quit-smoking products  Many products can help you quit smoking. Some are prescription medicines that help curb your cravings and withdrawal symptoms. Other products slowly lessen the level of nicotine your body absorbs. Nicotine is the highly addictive substance found in cigarettes, cigars, and chewing tobacco. Nicotine replacement products can help get your body used to slowly decreasing amounts of nicotine after you quit smoking. These products include a nicotine patch, gum, lozenge, nasal spray, and inhaler. Be sure you follow the directions for your medicine or product carefully. Your healthcare provider may tell you to start taking the prescription medicine a week before you plan to quit. Do not smoke while you use nicotine products. Doing so can be very harmful to your health.  For more information    https://smokefree.gov/sbid-cs-xt-expert    National Cancer [place] Smoking Quitline: 877-44U-QUIT (483-070-2708)   Date Last Reviewed: 2/1/2017 2000-2017 The [employer]. [address] [place] [address], [place] [address]. All rights reserved. This  information is not intended as a substitute for professional medical care. Always follow your healthcare professional's instructions.                Follow-ups after your visit        Future tests that were ordered for you today     Open Future Orders        Priority Expected Expires Ordered    Lipid Panel Routine  7/23/2019 7/23/2018    TSH Routine  7/23/2019 7/23/2018    Lyme Disease Ab with reflex to WB Serum Routine  7/23/2019 7/23/2018    Ehrlichia Anaplasma Sp by PCR Routine  7/24/2019 7/23/2018    CBC and Differential Routine  7/23/2019 7/23/2018    Basic Metabolic Panel Routine  7/23/2019 7/23/2018    Vitamin D Total Routine  7/23/2019 7/23/2018            Who to contact     If you have questions or need follow up information about today's clinic visit or your schedule please contact [facility] directly at [phone].  Normal or non-critical lab and imaging results will be communicated to you by MyChart, letter or phone within 4 business days after the clinic has received the results. If you do not hear from us within 7 days, please contact the clinic through [employer] or phone. If you have a critical or abnormal lab result, we will notify you by phone as soon as possible.  Submit refill requests through [employer] or call your pharmacy and they will forward the refill request to us. Please allow 3 business days for your refill to be completed.          Additional Information About Your Visit        MyChart Information     [employer] gives you secure access to your electronic health record. If you see a primary care provider, you can also send messages to your care team and make appointments. If you have questions, please call your primary care clinic.  If you do not have a primary care provider, please call [phone] and they will assist you.        Care EveryWhere ID     This is your Care EveryWhere ID. This could be used by other organizations to access your [facility]  records  [id number]        Your Vitals Were     Pulse BMI (Body Mass Index)                80 20.8 kg/m2           Blood Pressure from Last 3 Encounters:   07/23/18 130/80   03/14/17 120/76   03/03/17 130/70    Weight from Last 3 Encounters:   07/23/18 125 lb (56.7 kg)   03/14/17 141 lb 3.2 oz (64 kg)   03/03/17 134 lb 12.8 oz (61.1 kg)                 Today's Medication Changes          These changes are accurate as of 7/23/18  3:58 PM.  If you have any questions, ask your nurse or doctor.               Start taking these medicines.        Dose/Directions    buPROPion 150 MG 12 hr tablet   Commonly known as:  WELLBUTRIN SR   Used for:  Tobacco abuse   Started by:  Tami Sandoval PA-C        Take 1 tab PO qd x3 days; then increase to 1 tab PO BID; separate doses by at least 8h; last dose no later than 6pm   Quantity:  60 tablet   Refills:  2         These medicines have changed or have updated prescriptions.        Dose/Directions    Vitamin D2 2000 units Tabs   This may have changed:  Another medication with the same name was removed. Continue taking this medication, and follow the directions you see here.   Changed by:  Tami Sandoval PA-C        Dose:  1000 Units   Take 1,000 Units by mouth   Refills:  0            Where to get your medicines      These medications were sent to [facility] - [place], [place] - [address]  [address], [facility] [address]     Phone:  [phone]     buPROPion 150 MG 12 hr tablet                Primary Care Provider Fax #    Physician No Ref-Primary [phone]       No address on file        Equal Access to Services     [facility] Hospital[facility] : Hadii aad ku hadashsameer Sololy, waaxda luqadaha, qaybta kaalmada adeegritu, govind gallo. So [facility] [phone].    ATENCIÓN: Si habla español, tiene a lazo disposición servicios gratuitos de asistencia lingüística. Llame al [phone].    We comply with applicable federal civil rights laws  and [place] laws. We do not discriminate on the basis of race, color, national origin, age, disability, sex, sexual orientation, or gender identity.            Thank you!     Thank you for choosing [facility] [place] [facility]  for your care. Our goal is always to provide you with excellent care. Hearing back from our patients is one way we can continue to improve our services. Please take a few minutes to complete the written survey that you may receive in the mail after your visit with us. Thank you!             Your Updated Medication List - Protect others around you: Learn how to safely use, store and throw away your medicines at www.disposemymeds.org.          This list is accurate as of 7/23/18  3:58 PM.  Always use your most recent med list.                   Brand Name Dispense Instructions for use Diagnosis    brimonidine 0.2 % ophthalmic solution    ALPHAGAN     1 drop 3 times daily        buPROPion 150 MG 12 hr tablet    WELLBUTRIN SR    60 tablet    Take 1 tab PO qd x3 days; then increase to 1 tab PO BID; separate doses by at least 8h; last dose no later than 6pm    Tobacco abuse       cyclopentolate 1 % ophthalmic solution    CYCLOGYL     1 drop 1 time for 1 dose        timolol 0.5 % ophthalmic solution    TIMOPTIC     2 times daily.        Vitamin D2 2000 units Tabs      Take 1,000 Units by mouth

## 2018-07-23 NOTE — PATIENT INSTRUCTIONS
"Healthy Strategies  1. Eat at least 3 meals a day and never skip breakfast.  2. Eat more slowly.  3. Decrease portion size.  4. Provide structure by using meal replacement bars or shakes, and/or low calorie frozenmeals.  5. For good nutrition incorporate fruit, vegetables, whole grains, lean protein, and low-fat dairy.  6. Remove trigger foods from yourenvironment to avoid impulse eating.  7. Increase physical activity: get a pedometer and aim for 10,000 steps a day or 30-35 minutes of activity 5 days per week.  8. Weigh yourself daily or at least weekly.  9. Keep a record of what you eat and your activity.  10. Establish a support system such as afriend, group or program.    11. Read Luis Tinajero's \"Eat to Live\". Remember it is important to have a minimum of 1200 calories a day, okay to use olive oil, 40 grams of fiber daily. No more than two servings (the size of your palm) of red meat a week.     Please consider the following general health recommendations:    Schedule a mammogram annually starting at the age of 40 years old unless recommended earlier by your primary care provider. Come for a general exam once yearly.    Eat a quality diet (generally, low in simple sugars, starches, cholesterol and saturated fat.)    Please get 1500 mg of calcium in divided doses with 1500 units vitamin D in your diet daily. Take supplements as needed to obtain full recommended amounts.     Stay physically active. Regular walking or other exercise is one of the best ways to minimize pain of arthritis; maintain independence and mobility; maintain bone strength; maintain conditioning of your heart. Find something you enjoy and a friend to do it with you.    Maintain ideal weight. Your Body mass index is Body mass index is 20.8 kg/(m^2).. Generally a BMI of 20-25 is considered ideal. Overweight is defined as 25-30, obese is 30-35 and markedly obese is greater than 35.    Apply sun block (SPF 25 or greater) on exposed skin anytime " you are out in the sun to prevent skin cancer.     Wear a seatbelt whenever you are in a car.    Consider a bone density study every 2-5 years to see if you are at increased risk for fracture. If you have osteoporosis, medicine to strengthen your bones can significantly reduce your risk of fracture, back pain, and loss of height.    Colonoscopy (an exam of the colon) is recommended every 10 years after the age of 50 to screen for colon cancer. (More often if you are at increased risk.)    Obtain a flu shot every fall.    Receive a pneumonia shot series after the age of 65 (repeat in 5 years if you have other risk factors). This does not prevent all types of pneumonia, but reduces the risk of the worst bacterial causes of pneumonia.    You should have a tetanus booster at least once every 10 years.    A vaccine to reduce your chances of getting shingles is available if you are over 50. Information about the vaccine is available through the clinic or at:  (http://www.nlm.nih.gov/medlineplus/druginfo/medmaster/f692433.html)    Check blood sugar annually. Cholesterol annually unless you have had a normal level when last checked within 5 years.     I recommend that you have a general physical exam every year. You should have a pap test every 3 years between the ages of 21 and 30 and every 3-5 years between the ages of 30 and 65 depending on your test unless you have had previous abnormal pap smears, (in these cases the exams and PAP's should be done on a schedule as recommended by your primary care provider). If you have had hysterectomy in the past, your future Pap plan may be different.       How to Quit Smoking  Smoking is one of the hardest habits to break. About half of all people who have ever smoked have been able to quit. Most people who still smoke want to quit. Here are some of the best ways to stop smoking.    Keep trying  Most smokers make many attempts at quitting before they are successful. It s important  not to give up.  Go cold turkey  Most former smokers quit cold turkey (all at once). Trying to cut back gradually doesn't seem to work as well, perhaps because it continues the smoking habit. Also, it is possible to inhale more while smoking fewer cigarettes. This results in the same amount of nicotine in your body.  Get support  Support programs can be a big help, especially for heavy smokers. These groups offer lectures, ways to change behavior, and peer support. Here are some ways to find a support program:    Free national quitline: 800-QUIT-NOW (416-648-9503).    [facility] quit-smoking programs.    American Lung Association: (128.557.2719).    American Cancer Society (240-443-7479).  Support at home is important too. Nonsmokers can offer praise and encouragement. If the smoker in your life finds it hard to quit, encourage them to keep trying.  Over-the-counter medicines  Nicotine replacement therapy may make quitting easier. Certain aids, such as the nicotine patch, gum, and lozenges, are available without a prescription. It is best to use these under a doctor s care, though. The skin patch provides a steady supply of nicotine. Nicotine gum and lozenges give temporary bursts of low levels of nicotine. Both methods reduce the craving for cigarettes. Warning: If you have nausea, vomiting, dizziness, weakness, or a fast heartbeat, stop using these products and see your doctor.  Prescription medicines  After reviewing your smoking patterns and past attempts to quit, your doctor may offer a prescription medicine such as bupropion, varenicline, a nicotine inhaler, or nasal spray. Each has advantages and side effects. Your doctor can review these with you.  Health benefits of quitting  The benefits of quitting start right away and keep improving the longer you go without smoking. These benefits occur at any age.  So whether you are 17 or 70, quitting is a good decision. Some of the benefits include:    20 minutes:  "Blood pressure and pulse return to normal.    8 hours: Oxygen levels return to normal.    2 days: Ability to smell and taste begin to improve as damaged nerves regrow.    2 to 3 weeks: Circulation and lung function improve.    1 to 9 months: Coughing, congestion, and shortness of breath decrease; tiredness decreases.    1 year: Risk of heart attack decreases by half.    5 years: Risk of lung cancer decreases by half; risk of stroke becomes the same as a nonsmoker s.  For more on how to quit smoking, try these online resources:     Smokefree.gov    \"Clearing the Air\" booklet from the National Cancer [place]: smokefree.gov/sites/default/files/pdf/clearing-the-air-accessible.pdf  Date Last Reviewed: 3/1/2017    5656-4874 [employer]. [address] [place] [address]. All rights reserved. This information is not intended as a substitute for professional medical care. Always follow your healthcare professional's instructions.        Coping with Smoking Withdrawal    For the first few days after you quit smoking, you may feel cranky, restless, depressed, or low on energy. These are symptoms of withdrawal. Your body needs time to recover from smoking. Your symptoms should lessen within a few days.  Coping with the urge to smoke    Deep-breathe. Inhale through your nose. Count to 5. Slowly exhale through your mouth.    Drink water. Try to drink 8 or more 8-ounce glasses of water a day.    Keep your hands busy. Wash your car. Draw. Do a puzzle. Build a birdhouse.    Delay. The urge to smoke lasts only 3 to 5 minutes.  Get support  Individual, group, and telephone counseling can help keep you on track. Ask your healthcare provider for more information about resources available to you.  Control stress  After you quit, you may feel irritable and stressed. Try taking a warm bath or shower. Listen to music. Go for a walk or to the gym. Try yoga or meditate. Call friends or talk with a " professional.  Exercise  Exercise helps your body and mind feel better. There are many ways to be more active. Find something you enjoy doing. See if a friend will join you for a walk or a bike ride.  Sleep better  You may feel tired but have trouble falling asleep. Try to relax before bed. Do a few stretching exercises. Read for a while. Also, avoid caffeine for at least a few hours before bedtime.  Get fit, not fat  You may notice an increased appetite. Many people who quit smoking gain a few pounds. To limit weight gain, try to watch what you eat. Cut back on fat in your diet. Snack on low-calorie foods, like fresh fruits and vegetables. Drink low-calorie liquids, especially water. Regular exercise can also help you stay fit. And remember: Your main goal is to be a nonsmoker. Stay focused on that goal.  Quit-smoking products  There are a number of products that can help you quit smoking including medicines and nicotine replacement products. They are available over-the-counter or by prescription. Ask your healthcare provider if any of these could help you quit smoking.        For more information    https://smokefree.gov/ibwa-vf-wy-expert    National Cancer [place] Smoking Quitline: 877-44U-QUIT (668-034-2654)   Date Last Reviewed: 2/1/2017 2000-2017 [employer]. [address] [place] [id number]. All rights reserved. This information is not intended as a substitute for professional medical care. Always follow your healthcare professional's instructions.        Getting Support for Quitting Smoking  You don t have to go through the process of quitting smoking without support. Tell people you are quitting. The support of friends, coworkers, and family members can make a big difference. Face-to-face or telephone counseling can also be helpful, as can a stop-smoking class or an ex-smokers  group.    Set a quit date  If you re serious about quitting smoking, choose a date within the next 2 to  4 weeks. John it in bright, bold letters on a calendar you use often. Tell people about your quit date. Ask for their support. Let your friends and family know how they can help you quit.  Make a contract  A quit-smoking contract gives you a goal. Write out the contract and sign it. Have it witnessed, if you like. Then keep the contract where you ll see it often, or carry it with you. Read the contract when you re tempted to smoke.  Take action  On the day you quit, reread your quit contract. Think about the benefits you gain by quitting, such as better health and an improved sense of taste.    Remove cigarettes from your home, car, or any other place where you stash them.    Throw away all smoking materials, including matches, lighters, and ashtrays.    Review your list of triggers and your plan for coping with them.    Stay away from people or settings you link with smoking.    Make a survival kit that includes gum, mints, carrot sticks, and things to keep your hands busy.    Talk to your healthcare provider about using quit-smoking products, such as medication or a nicotine patch, inhaler, nasal spray, gum, or lozenges.  Ask for help  Sometimes you may just need to talk when you miss smoking. Ex-smokers are good to talk to, because they re likely to know how you feel. You may need extra support in the first few weeks after you quit. Ask a friend to call you each day to see how you re doing. Telephone counseling can also help you keep on track. Ask your healthcare provider, local hospital, or public health department to put you in touch with a phone counselor. You may also have to deal with doubters when you decide to quit. Explain to any doubters why you are quitting. Tell them that quitting is important to you. Ask for their support. Tell your smoking buddies that you can walk together instead of smoking together. If someone thinks you won t succeed, say that you have a good quit plan and ask for their support.  Let him or her know you re sticking with it.     For more information    https://smokefree.gov/yszo-xm-zd-expert    National Cancer [place] Smoking Quitline: 877-44U-QUIT (405-544-3262)   Date Last Reviewed: 2/1/2017 2000-2017 [employer]. [address] [place] [address], [place] [address]. All rights reserved. This information is not intended as a substitute for professional medical care. Always follow your healthcare professional's instructions.        Planning to Quit Smoking  Your healthcare provider may have told you that you need to give up tobacco. Only you can decide if and when you are ready to quit. Quitting is hard to do. But the benefits will be worth it. When you  decide to quit, come up with a plan that s right for you. Discuss your plan with your healthcare provider. And talk to your provider about medicines to help you quit.    Line up support  To quit smoking, you ll need a plan and some help. Pick a date within the next 2 to 4 weeks to quit. Use the time between now and that date to arrange for support.    Classes and counselors. Quit-smoking classes  people like you through the process. Get to know others in a class, and support each other beyond the class. Telephone counseling also helps you keep on track. Ask your healthcare provider, local hospital, or public health department to put you in touch with a class and a phone counselor.    Family and friends. Tell your family and friends about your quit date. Ask them to support your change. If they smoke, arrange to see them in smoke-free places. Forbid smoking in your home and car.     Finding something to replace cigarettes may be hard to do. Be aware that some things you choose may be as harmful as cigarettes:    Smokeless (chewing) tobacco is just as harmful as regular tobacco. Tobacco should not be used as a substitute for cigarettes.    Herbal medicines or teas may affect how your body handles nicotine. Talk to your healthcare  provider before using these products.    E-cigarettes have less toxins than the smoke from a regular cigarette. But the FDA says that these devices may still have substances that can cause cancer. E-cigarettes are not well regulated. They have not been studied enough to know if they are a good aid to help you stop smoking. Talk with your healthcare provider before using these products.      Quit-smoking products  Many products can help you quit smoking. Some are prescription medicines that help curb your cravings and withdrawal symptoms. Other products slowly lessen the level of nicotine your body absorbs. Nicotine is the highly addictive substance found in cigarettes, cigars, and chewing tobacco. Nicotine replacement products can help get your body used to slowly decreasing amounts of nicotine after you quit smoking. These products include a nicotine patch, gum, lozenge, nasal spray, and inhaler. Be sure you follow the directions for your medicine or product carefully. Your healthcare provider may tell you to start taking the prescription medicine a week before you plan to quit. Do not smoke while you use nicotine products. Doing so can be very harmful to your health.  For more information    https://smokefree.gov/kqjn-ot-ev-expert    National Cancer [place] Smoking Quitline: 877-44U-QUIT (203-202-6154)   Date Last Reviewed: 2/1/2017 2000-2017 [employer]. [address] [place] [address], [place] [address]. All rights reserved. This information is not intended as a substitute for professional medical care. Always follow your healthcare professional's instructions.

## 2018-07-23 NOTE — PROGRESS NOTES
Nursing Notes:   Nettie Pickard LPN  7/23/2018  3:34 PM  Signed  Patient presents to clinic for physical.  Susan Pickard LPN ...... 7/23/2018 3:25 PM        ANNUAL PHYSICAL - FEMALE    HPI: Alondra Farrar who presents for a yearly exam.  Concerns include: hx low vit D.  She would like to have this rechecked.  She has been more fatigued lately.    Hx zyban for smoking cessation in the past.  This worked well.  She would like to try it again.  No side effects noted previously.    Low back pain. Tired.  She would like to have her labs checked to rule out concerns with the fatigue.  Would like to complete a urinalysis to rule out bladder or kidney concerns.  No dysuria, frequency, urgency, blood in stool or urine.  She is currently going to a chiropractor for her low back pain which is been helping.  No recent injury.  No back trauma.    No LMP recorded. Patient is postmenopausal.   Contraception: postmenopausal, no abnormal bleeding.  Risk for STI?: no  Last pap: 3/3/2017 - nl, repeat in 3 years  Any hx of abnormal paps:  no  FH of early CA?: no  Cholesterol/DM concerns/screening: need checked  Tobacco?: yes  Calcium intake: yes  DEXA: na  Last mammo: 2012-normal, declines repeat mammogram.  No current lumps or bumps that she is appreciated on self breast exams.  She is fine with completing a breast exam in clinic today.  Colonoscopy: declined referral  Immunizations: UTD    Patient Active Problem List    Diagnosis Date Noted     Condyloma acuminata 04/12/2012     Priority: Medium     Acne rosacea 04/05/2012     Priority: Medium     Alcoholism (H) 04/05/2012     Priority: Medium     Other specified glaucoma 04/05/2012     Priority: Medium     Overview:   onset age [age]       Skin lesion 04/05/2012     Priority: Medium     Sprain of lumbar region 12/29/2006     Priority: Medium     Overview:   IMO Update 10/11       Sprain of neck 12/29/2006     Priority: Medium     Overview:   IMO Update 10/11        Sprain of thoracic region 2006     Priority: Medium     Overview:   IMO Update 10/11       Tobacco use disorder 2006     Priority: Medium     Profound impairment, one eye, impairment level not further specified 10/27/2004     Priority: Medium     Overview:   right eye         Past Medical History:   Diagnosis Date     Encounter for full-term uncomplicated delivery       x 4       Past Surgical History:   Procedure Laterality Date     APPENDECTOMY OPEN      No Comments Provided     EXTRACAPSULAR CATARACT EXTRATION WITH INTRAOCULAR LENS IMPLANT      Cataract surgery as a teen     LAPAROSCOPIC TUBAL LIGATION      No Comments Provided       Family History   Problem Relation Age of Onset     Substance Abuse Mother      Alcohol/Drug,  age 58 due to cirrhosis     HEART DISEASE Father      Heart Disease,MI in his 60's     Family History Negative Other      Good Health,Siblings healthy       Social History     Social History     Marital status: Legally      Spouse name: N/A     Number of children: N/A     Years of education: N/A     Occupational History     Not on file.     Social History Main Topics     Smoking status: Current Every Day Smoker     Packs/day: 1.00     Types: Cigarettes     Smokeless tobacco: Never Used     Alcohol use Yes      Comment: Alcoholic Drinks/day: occ     Drug use: Not on file      Comment: Drug use: No     Sexual activity: Yes     Other Topics Concern     Not on file     Social History Narrative    Marital Status:   Children: 4 children  Occupation:        Current Outpatient Prescriptions   Medication Sig Dispense Refill     brimonidine (ALPHAGAN) 0.2 % ophthalmic solution 1 drop 3 times daily       buPROPion (WELLBUTRIN SR) 150 MG 12 hr tablet Take 1 tab PO qd x3 days; then increase to 1 tab PO BID; separate doses by at least 8h; last dose no later than 6pm 60 tablet 2     cyclopentolate (CYCLOGYL) 1 % ophthalmic solution 1 drop 1 time for 1  dose       Ergocalciferol (VITAMIN D2) 2000 units TABS Take 1,000 Units by mouth       timolol (TIMOPTIC) 0.5 % ophthalmic solution 2 times daily.         Allergies   Allergen Reactions     Tetracycline Itching       REVIEW OF SYSTEMS:  Refer to HPI.    PHYSICAL EXAM:  /80 (BP Location: Right arm, Patient Position: Sitting, Cuff Size: Adult Regular)  Pulse 80  Wt 125 lb (56.7 kg)  BMI 20.8 kg/m2  CONSTITUTIONAL:  Alert,cooperative, NAD.  EYES: No scleral icterus.  PERRLA.  Conjunctiva clear.  ENT/MOUTH: External ears and nose normal.  TMs normal.  Moist mucous membranes. Oropharynx clear.    ENDO: No thyromegaly or thyroidnodules.  LYMPH:  No cervical or supraclavicular LA.    BREASTS: No skin abnormalities, no erythema.  No discrete masses.  No nipple discharge, no axillary, supra- or infraclavicular LA.   CARDIOVASCULAR: Regular,S1, S2.  No S3 or S4.  No murmur/gallop/rub.  No peripheral edema.  RESPIRATORY: CTA bilaterally, no wheezes, rhonchi or rales.  GI: Bowel sounds wnl.  Soft, nontender, nondistended.  No masses or HSM.  No rebound or guarding.  : declines exam  Pap smear obtained: no  MSKEL: Grossly normal ROM.  No clubbing.  INTEGUMENTARY:  Warm, dry.  No rash noted on exposed skin.  NEUROLOGIC: Facies symmetric.  Grossly normal movement and tone.  No tremor.  PSYCHIATRIC: Affect normal.  Speech fluent.      PHQ Depression Screen  No flowsheet data found.        ASSESSMENT AND PLAN:    1. Routine history and physical examination of adult    2. Fatigue, unspecified type    3. Tobacco abuse    4. Screening cholesterol level    5. Acute bilateral low back pain without sciatica        Fatigue: Completed labs to rule out concerns.  Completed CBC, BMP, TSH, vitamin D, Lyme's, anaplasmosis and ehrlichiosis.  Results pending.  Encourage good diet and exercise.    Tobacco abuse: Started patient on Wellbutrin SR.  Gave patient education.  Return as needed for recheck.    Low back pain: Completed labs to  "rule out concerns.  Completed urinalysis.  Results pending.    Screen for cholesterol and diabetes concerns.  Results pending.    Patient declined referral for mammogram and colonoscopy at this time.    Patient Instructions     Healthy Strategies  1. Eat at least 3 meals a day and never skip breakfast.  2. Eat more slowly.  3. Decrease portion size.  4. Provide structure by using meal replacement bars or shakes, and/or low calorie frozenmeals.  5. For good nutrition incorporate fruit, vegetables, whole grains, lean protein, and low-fat dairy.  6. Remove trigger foods from yourenvironment to avoid impulse eating.  7. Increase physical activity: get a pedometer and aim for 10,000 steps a day or 30-35 minutes of activity 5 days per week.  8. Weigh yourself daily or at least weekly.  9. Keep a record of what you eat and your activity.  10. Establish a support system such as afriend, group or program.    11. Read Luis Tinajero's \"Eat to Live\". Remember it is important to have a minimum of 1200 calories a day, okay to use olive oil, 40 grams of fiber daily. No more than two servings (the size of your palm) of red meat a week.     Please consider the following general health recommendations:    Schedule a mammogram annually starting at the age of 40 years old unless recommended earlier by your primary care provider. Come for a general exam once yearly.    Eat a quality diet (generally, low in simple sugars, starches, cholesterol and saturated fat.)    Please get 1500 mg of calcium in divided doses with 1500 units vitamin D in your diet daily. Take supplements as needed to obtain full recommended amounts.     Stay physically active. Regular walking or other exercise is one of the best ways to minimize pain of arthritis; maintain independence and mobility; maintain bone strength; maintain conditioning of your heart. Find something you enjoy and a friend to do it with you.    Maintain ideal weight. Your Body mass index is Body " mass index is 20.8 kg/(m^2).. Generally a BMI of 20-25 is considered ideal. Overweight is defined as 25-30, obese is 30-35 and markedly obese is greater than 35.    Apply sun block (SPF 25 or greater) on exposed skin anytime you are out in the sun to prevent skin cancer.     Wear a seatbelt whenever you are in a car.    Consider a bone density study every 2-5 years to see if you are at increased risk for fracture. If you have osteoporosis, medicine to strengthen your bones can significantly reduce your risk of fracture, back pain, and loss of height.    Colonoscopy (an exam of the colon) is recommended every 10 years after the age of 50 to screen for colon cancer. (More often if you are at increased risk.)    Obtain a flu shot every fall.    Receive a pneumonia shot series after the age of 65 (repeat in 5 years if you have other risk factors). This does not prevent all types of pneumonia, but reduces the risk of the worst bacterial causes of pneumonia.    You should have a tetanus booster at least once every 10 years.    A vaccine to reduce your chances of getting shingles is available if you are over 50. Information about the vaccine is available through the clinic or at:  (http://www.nlm.nih.gov/medlineplus/druginfo/medmaster/k824817.html)    Check blood sugar annually. Cholesterol annually unless you have had a normal level when last checked within 5 years.     I recommend that you have a general physical exam every year. You should have a pap test every 3 years between the ages of 21 and 30 and every 3-5 years between the ages of 30 and 65 depending on your test unless you have had previous abnormal pap smears, (in these cases the exams and PAP's should be done on a schedule as recommended by your primary care provider). If you have had hysterectomy in the past, your future Pap plan may be different.       How to Quit Smoking  Smoking is one of the hardest habits to break. About half of all people who have ever  smoked have been able to quit. Most people who still smoke want to quit. Here are some of the best ways to stop smoking.    Keep trying  Most smokers make many attempts at quitting before they are successful. It s important not to give up.  Go cold turkey  Most former smokers quit cold turkey (all at once). Trying to cut back gradually doesn't seem to work as well, perhaps because it continues the smoking habit. Also, it is possible to inhale more while smoking fewer cigarettes. This results in the same amount of nicotine in your body.  Get support  Support programs can be a big help, especially for heavy smokers. These groups offer lectures, ways to change behavior, and peer support. Here are some ways to find a support program:    Free national quitline: 800QUIT-NOW (766-758-2313).    [employer] quit-smoking programs.    American Lung Association: (220.973.8445).    American Cancer Society (530-460-1623).  Support at home is important too. Nonsmokers can offer praise and encouragement. If the smoker in your life finds it hard to quit, encourage them to keep trying.  Over-the-counter medicines  Nicotine replacement therapy may make quitting easier. Certain aids, such as the nicotine patch, gum, and lozenges, are available without a prescription. It is best to use these under a doctor s care, though. The skin patch provides a steady supply of nicotine. Nicotine gum and lozenges give temporary bursts of low levels of nicotine. Both methods reduce the craving for cigarettes. Warning: If you have nausea, vomiting, dizziness, weakness, or a fast heartbeat, stop using these products and see your doctor.  Prescription medicines  After reviewing your smoking patterns and past attempts to quit, your doctor may offer a prescription medicine such as bupropion, varenicline, a nicotine inhaler, or nasal spray. Each has advantages and side effects. Your doctor can review these with you.  Health benefits of quitting  The benefits  "of quitting start right away and keep improving the longer you go without smoking. These benefits occur at any age.  So whether you are 17 or 70, quitting is a good decision. Some of the benefits include:    20 minutes: Blood pressure and pulse return to normal.    8 hours: Oxygen levels return to normal.    2 days: Ability to smell and taste begin to improve as damaged nerves regrow.    2 to 3 weeks: Circulation and lung function improve.    1 to 9 months: Coughing, congestion, and shortness of breath decrease; tiredness decreases.    1 year: Risk of heart attack decreases by half.    5 years: Risk of lung cancer decreases by half; risk of stroke becomes the same as a nonsmoker s.  For more on how to quit smoking, try these online resources:     Smokefree.gov    \"Clearing the Air\" booklet from the National Cancer [place]: smokefree.gov/sites/default/files/pdf/clearing-the-air-accessible.pdf  Date Last Reviewed: 3/1/2017    7239-5602 [employer]. [address] [place] [address]. All rights reserved. This information is not intended as a substitute for professional medical care. Always follow your healthcare professional's instructions.        Coping with Smoking Withdrawal    For the first few days after you quit smoking, you may feel cranky, restless, depressed, or low on energy. These are symptoms of withdrawal. Your body needs time to recover from smoking. Your symptoms should lessen within a few days.  Coping with the urge to smoke    Deep-breathe. Inhale through your nose. Count to 5. Slowly exhale through your mouth.    Drink water. Try to drink 8 or more 8-ounce glasses of water a day.    Keep your hands busy. Wash your car. Draw. Do a puzzle. Build a birdhouse.    Delay. The urge to smoke lasts only 3 to 5 minutes.  Get support  Individual, group, and telephone counseling can help keep you on track. Ask your healthcare provider for more information about resources available to " you.  Control stress  After you quit, you may feel irritable and stressed. Try taking a warm bath or shower. Listen to music. Go for a walk or to the gym. Try yoga or meditate. Call friends or talk with a professional.  Exercise  Exercise helps your body and mind feel better. There are many ways to be more active. Find something you enjoy doing. See if a friend will join you for a walk or a bike ride.  Sleep better  You may feel tired but have trouble falling asleep. Try to relax before bed. Do a few stretching exercises. Read for a while. Also, avoid caffeine for at least a few hours before bedtime.  Get fit, not fat  You may notice an increased appetite. Many people who quit smoking gain a few pounds. To limit weight gain, try to watch what you eat. Cut back on fat in your diet. Snack on low-calorie foods, like fresh fruits and vegetables. Drink low-calorie liquids, especially water. Regular exercise can also help you stay fit. And remember: Your main goal is to be a nonsmoker. Stay focused on that goal.  Quit-smoking products  There are a number of products that can help you quit smoking including medicines and nicotine replacement products. They are available over-the-counter or by prescription. Ask your healthcare provider if any of these could help you quit smoking.        For more information    https://smokefree.gov/idmi-ey-xa-expert    National Cancer [place] Smoking Quitline: 877-44U-QUIT (009-176-1776)   Date Last Reviewed: 2/1/2017 2000-2017 [employer]. [address] [place] [address], [place] [id number]. All rights reserved. This information is not intended as a substitute for professional medical care. Always follow your healthcare professional's instructions.        Getting Support for Quitting Smoking  You don t have to go through the process of quitting smoking without support. Tell people you are quitting. The support of friends, coworkers, and family members can make a big difference.  Face-to-face or telephone counseling can also be helpful, as can a stop-smoking class or an ex-smokers  group.    Set a quit date  If you re serious about quitting smoking, choose a date within the next 2 to 4 weeks. John it in bright, bold letters on a calendar you use often. Tell people about your quit date. Ask for their support. Let your friends and family know how they can help you quit.  Make a contract  A quit-smoking contract gives you a goal. Write out the contract and sign it. Have it witnessed, if you like. Then keep the contract where you ll see it often, or carry it with you. Read the contract when you re tempted to smoke.  Take action  On the day you quit, reread your quit contract. Think about the benefits you gain by quitting, such as better health and an improved sense of taste.    Remove cigarettes from your home, car, or any other place where you stash them.    Throw away all smoking materials, including matches, lighters, and ashtrays.    Review your list of triggers and your plan for coping with them.    Stay away from people or settings you link with smoking.    Make a survival kit that includes gum, mints, carrot sticks, and things to keep your hands busy.    Talk to your healthcare provider about using quit-smoking products, such as medication or a nicotine patch, inhaler, nasal spray, gum, or lozenges.  Ask for help  Sometimes you may just need to talk when you miss smoking. Ex-smokers are good to talk to, because they re likely to know how you feel. You may need extra support in the first few weeks after you quit. Ask a friend to call you each day to see how you re doing. Telephone counseling can also help you keep on track. Ask your healthcare provider, local hospital, or public health department to put you in touch with a phone counselor. You may also have to deal with doubters when you decide to quit. Explain to any doubters why you are quitting. Tell them that quitting is important to  you. Ask for their support. Tell your smoking buddies that you can walk together instead of smoking together. If someone thinks you won t succeed, say that you have a good quit plan and ask for their support. Let him or her know you re sticking with it.     For more information    https://smokefree.gov/zwzr-aw-mq-expert    National Cancer [place] Smoking Quitline: 877-44U-QUIT (860-965-9345)   Date Last Reviewed: 2/1/2017 2000-2017 [employer]. [address] [place] [address]. All rights reserved. This information is not intended as a substitute for professional medical care. Always follow your healthcare professional's instructions.        Planning to Quit Smoking  Your healthcare provider may have told you that you need to give up tobacco. Only you can decide if and when you are ready to quit. Quitting is hard to do. But the benefits will be worth it. When you  decide to quit, come up with a plan that s right for you. Discuss your plan with your healthcare provider. And talk to your provider about medicines to help you quit.    Line up support  To quit smoking, you ll need a plan and some help. Pick a date within the next 2 to 4 weeks to quit. Use the time between now and that date to arrange for support.    Classes and counselors. Quit-smoking classes  people like you through the process. Get to know others in a class, and support each other beyond the class. Telephone counseling also helps you keep on track. Ask your healthcare provider, local hospital, or public health department to put you in touch with a class and a phone counselor.    Family and friends. Tell your family and friends about your quit date. Ask them to support your change. If they smoke, arrange to see them in smoke-free places. Forbid smoking in your home and car.     Finding something to replace cigarettes may be hard to do. Be aware that some things you choose may be as harmful as cigarettes:    Smokeless  (chewing) tobacco is just as harmful as regular tobacco. Tobacco should not be used as a substitute for cigarettes.    Herbal medicines or teas may affect how your body handles nicotine. Talk to your healthcare provider before using these products.    E-cigarettes have less toxins than the smoke from a regular cigarette. But the FDA says that these devices may still have substances that can cause cancer. E-cigarettes are not well regulated. They have not been studied enough to know if they are a good aid to help you stop smoking. Talk with your healthcare provider before using these products.      Quit-smoking products  Many products can help you quit smoking. Some are prescription medicines that help curb your cravings and withdrawal symptoms. Other products slowly lessen the level of nicotine your body absorbs. Nicotine is the highly addictive substance found in cigarettes, cigars, and chewing tobacco. Nicotine replacement products can help get your body used to slowly decreasing amounts of nicotine after you quit smoking. These products include a nicotine patch, gum, lozenge, nasal spray, and inhaler. Be sure you follow the directions for your medicine or product carefully. Your healthcare provider may tell you to start taking the prescription medicine a week before you plan to quit. Do not smoke while you use nicotine products. Doing so can be very harmful to your health.  For more information    https://smokefree.gov/yqhe-xa-wn-expert    National Cancer [place] Smoking Quitline: 877-44U-QUIT (204-158-1521)   Date Last Reviewed: 2/1/2017 2000-2017 [employer]. [address] [place] [address]. All rights reserved. This information is not intended as a substitute for professional medical care. Always follow your healthcare professional's instructions.            Relevant cancer screening discussed.    Counseled on healthy diet, Calcium and vitamin D intake, and exercise.    Tami  Lori LAND................7/23/2018 3:31 PM

## 2018-07-25 LAB — B BURGDOR IGG+IGM SER QL: 0.2 (ref 0–0.89)

## 2018-07-27 LAB
A PHAGOCYTOPH DNA BLD QL NAA+PROBE: NOT DETECTED
E CHAFFEENSIS DNA BLD QL NAA+PROBE: NOT DETECTED
E EWINGII DNA SPEC QL NAA+PROBE: NOT DETECTED
EHRLICHIA DNA SPEC QL NAA+PROBE: NOT DETECTED

## 2018-08-06 ENCOUNTER — TRANSFERRED RECORDS (OUTPATIENT)
Dept: HEALTH INFORMATION MANAGEMENT | Facility: OTHER | Age: 53
End: 2018-08-06

## 2019-10-21 ENCOUNTER — TRANSFERRED RECORDS (OUTPATIENT)
Dept: HEALTH INFORMATION MANAGEMENT | Facility: CLINIC | Age: 54
End: 2019-10-21

## 2019-11-08 ENCOUNTER — OFFICE VISIT (OUTPATIENT)
Dept: FAMILY MEDICINE | Facility: OTHER | Age: 54
End: 2019-11-08
Attending: FAMILY MEDICINE
Payer: COMMERCIAL

## 2019-11-08 VITALS
OXYGEN SATURATION: 98 % | TEMPERATURE: 97.9 F | BODY MASS INDEX: 22.49 KG/M2 | SYSTOLIC BLOOD PRESSURE: 140 MMHG | HEIGHT: 65 IN | WEIGHT: 135 LBS | HEART RATE: 76 BPM | RESPIRATION RATE: 16 BRPM | DIASTOLIC BLOOD PRESSURE: 98 MMHG

## 2019-11-08 DIAGNOSIS — J30.9 ALLERGIC SINUSITIS: Primary | ICD-10-CM

## 2019-11-08 DIAGNOSIS — S30.820A: ICD-10-CM

## 2019-11-08 PROCEDURE — G0463 HOSPITAL OUTPT CLINIC VISIT: HCPCS

## 2019-11-08 PROCEDURE — 99213 OFFICE O/P EST LOW 20 MIN: CPT | Performed by: FAMILY MEDICINE

## 2019-11-08 RX ORDER — FLUTICASONE PROPIONATE 50 MCG
1 SPRAY, SUSPENSION (ML) NASAL DAILY
Qty: 9.9 ML | Refills: 3 | Status: SHIPPED | OUTPATIENT
Start: 2019-11-08 | End: 2019-12-02

## 2019-11-08 RX ORDER — PREDNISONE 10 MG/1
30 TABLET ORAL DAILY
Qty: 15 TABLET | Refills: 0 | Status: SHIPPED | OUTPATIENT
Start: 2019-11-08 | End: 2019-11-08

## 2019-11-08 ASSESSMENT — ENCOUNTER SYMPTOMS
COUGH: 1
FEVER: 0
FATIGUE: 1
CHILLS: 1
SHORTNESS OF BREATH: 0
RHINORRHEA: 1
SORE THROAT: 1

## 2019-11-08 ASSESSMENT — PATIENT HEALTH QUESTIONNAIRE - PHQ9: SUM OF ALL RESPONSES TO PHQ QUESTIONS 1-9: 5

## 2019-11-08 ASSESSMENT — MIFFLIN-ST. JEOR: SCORE: 1213.24

## 2019-11-08 ASSESSMENT — PAIN SCALES - GENERAL: PAINLEVEL: MILD PAIN (2)

## 2019-11-08 NOTE — PROGRESS NOTES
SUBJECTIVE:   Alondra Farrar is a 54 year old female who presents to clinic today for the following health issues:    HPI  Sinus Issues:  Has a history of problems due to allergies.  Over the past couple of months, she reports that her postnasal drainage has been thicker.  Has developed sore throat and swallowing the mucous has caused her to become nauseated.  Associated with cough productive of thick white mucous.  No blood.  Waking up frequently coughing.  Nasal congestion and rhinorrhea.  No ear pain.  Dull headache over right side.  Has tried OTC cough and cold medicines.  Reports taking an off bran anti allergy pill intermittently.    Blister:  Located on her buttocks.  First noticed it on Saturday.  Has since shrunk in size and skin covering layer has come off.  Has put antibiotic ointment on it but tried nothing else for treatment.  No erythema, swelling, or purulent drainage.    Past Medical History:   Diagnosis Date     Encounter for full-term uncomplicated delivery       x 4      Past Surgical History:   Procedure Laterality Date     APPENDECTOMY OPEN      No Comments Provided     EXTRACAPSULAR CATARACT EXTRATION WITH INTRAOCULAR LENS IMPLANT      Cataract surgery as a teen     LAPAROSCOPIC TUBAL LIGATION      No Comments Provided     Family History   Problem Relation Age of Onset     Substance Abuse Mother         Alcohol/Drug,  age 58 due to cirrhosis     Heart Disease Father         Heart Disease,MI in his 60's     Family History Negative Other         Good Health,Siblings healthy     Social History     Tobacco Use     Smoking status: Current Every Day Smoker     Packs/day: 1.00     Types: Cigarettes     Smokeless tobacco: Never Used   Substance Use Topics     Alcohol use: Yes     Comment: Alcoholic Drinks/day: occ     Current Outpatient Medications   Medication Sig Dispense Refill     brimonidine (ALPHAGAN) 0.2 % ophthalmic solution 1 drop 3 times daily       cyclopentolate  "(CYCLOGYL) 1 % ophthalmic solution 1 drop 1 time for 1 dose       Ergocalciferol (VITAMIN D2) 2000 units TABS Take 1,000 Units by mouth       fluticasone (FLONASE) 50 MCG/ACT nasal spray Spray 1 spray into both nostrils daily 9.9 mL 3     timolol (TIMOPTIC) 0.5 % ophthalmic solution 2 times daily.       Allergies   Allergen Reactions     Tetracycline Itching     Review of Systems   Constitutional: Positive for chills and fatigue. Negative for fever.   HENT: Positive for congestion, postnasal drip, rhinorrhea and sore throat. Negative for ear pain.    Respiratory: Positive for cough. Negative for shortness of breath.    Cardiovascular: Negative for chest pain.        OBJECTIVE:     BP (!) 140/98   Pulse 76   Temp 97.9  F (36.6  C) (Temporal)   Resp 16   Ht 1.651 m (5' 5\")   Wt 61.2 kg (135 lb)   SpO2 98%   BMI 22.47 kg/m    Body mass index is 22.47 kg/m .  Physical Exam  Constitutional:       General: She is not in acute distress.     Appearance: Normal appearance.   HENT:      Right Ear: Tympanic membrane normal.      Left Ear: Tympanic membrane normal.      Nose: Congestion and rhinorrhea present. Rhinorrhea is clear.      Mouth/Throat:      Mouth: Mucous membranes are moist.      Pharynx: Oropharynx is clear. No oropharyngeal exudate or posterior oropharyngeal erythema.   Cardiovascular:      Rate and Rhythm: Normal rate and regular rhythm.   Pulmonary:      Effort: Pulmonary effort is normal.      Breath sounds: Normal breath sounds. No rhonchi or rales.      Comments: Minimal wheeze with coughing.  Skin:     General: Skin is warm and dry.      Comments: Approximately 1 cm x 1 cm open blister, well-healing in appearance.  No surrounding erythema or swelling.  No drainage.   Neurological:      Mental Status: She is alert.       Diagnostic Test Results:  none     ASSESSMENT/PLAN:     1. Allergic sinusitis  Recommend nasal saline spray twice daily and Flonase used once daily.  Instructed on proper use.  " Return for reevaluation if symptoms worsening or failing to improve or fever develops.  - fluticasone (FLONASE) 50 MCG/ACT nasal spray; Spray 1 spray into both nostrils daily  Dispense: 9.9 mL; Refill: 3    2. Blister of buttock without infection, initial encounter  Well-healing.  Continue routine wound care.  Return for reevaluation if worsening, failing to improve, or concern for infection.    Follow-up with PCP for annual physical and repeat blood pressure measurement.    DO G[facility] [place]

## 2019-11-08 NOTE — PATIENT INSTRUCTIONS
Patient Education     Nasal Allergies: Related Problems  Allergies can cause nasal passages to swell. This narrows the air passages. Allergies also cause increased mucus production in the nose. These changes result in nasal allergy symptoms. Common symptoms include itching, sneezing, stuffy nose, and runny nose. Nasal allergies can also cause problems in other parts of the respiratory system. Some of the more common problems are discussed below. If you think you have any of these problems, talk to your healthcare provider about treatment choices.    Sinus infections  Fluid may be trapped in the sinuses. Bacteria may grow in trapped fluid. This causes sinus infection (sinusitis).  Conjunctivitis  Allergens irritate your eyes, including the lining of the conjunctiva. This causes eyes to become red, itchy, puffy, and watery.  Ear problems  The eustachian tube connects the middle ear to nasal passages.  Allergies can block this tube, and make the ears feel plugged. Fluid may also build up, leading to an ear infection (otitis media).  Nasal polyps  Allergies cause nasal passages to swell. Constant swelling can lead to formation of a sac called a polyp. Polyps can grow large enough to block nasal passages.  Asthma  Asthma is inflammation and swelling of the air passages in the lungs. The symptoms are wheezing, shortness of breath, coughing, and chest tightness. Allergies, including nasal allergies, are common in people with asthma.  Date Last Reviewed: 9/1/2016 2000-2018 [employer]. [address] [place] [address]. All rights reserved. This information is not intended as a substitute for professional medical care. Always follow your healthcare professional's instructions.

## 2019-11-08 NOTE — NURSING NOTE
"Chief Complaint   Patient presents with     Sinus Problem     getting worse for the last 3 weeks         Initial BP (!) 140/98   Pulse 76   Temp 97.9  F (36.6  C) (Temporal)   Resp 16   Ht 1.651 m (5' 5\")   Wt 61.2 kg (135 lb)   SpO2 98%   BMI 22.47 kg/m   Estimated body mass index is 22.47 kg/m  as calculated from the following:    Height as of this encounter: 1.651 m (5' 5\").    Weight as of this encounter: 61.2 kg (135 lb).    Medication Reconciliation: complete      Norma J. Gosselin, LPN  "

## 2019-11-12 ENCOUNTER — OFFICE VISIT (OUTPATIENT)
Dept: OPHTHALMOLOGY | Facility: CLINIC | Age: 54
End: 2019-11-12
Attending: OPHTHALMOLOGY
Payer: COMMERCIAL

## 2019-11-12 DIAGNOSIS — H40.51X3 SECONDARY GLAUCOMA OF RIGHT EYE, SEVERE STAGE: Primary | ICD-10-CM

## 2019-11-12 DIAGNOSIS — H17.9 SCAR OF CORNEA OF RIGHT EYE: ICD-10-CM

## 2019-11-12 DIAGNOSIS — H44.521 PHTHISIS BULBI OF RIGHT EYE: ICD-10-CM

## 2019-11-12 DIAGNOSIS — Z96.1 PSEUDOPHAKIA OF RIGHT EYE: ICD-10-CM

## 2019-11-12 DIAGNOSIS — H44.411: Primary | ICD-10-CM

## 2019-11-12 PROCEDURE — 40000269 ZZH STATISTIC NO CHARGE FACILITY FEE: Mod: ZF

## 2019-11-12 PROCEDURE — 76512 OPH US DX B-SCAN: CPT | Mod: ZF | Performed by: OPHTHALMOLOGY

## 2019-11-12 PROCEDURE — 76514 ECHO EXAM OF EYE THICKNESS: CPT | Mod: ZF | Performed by: OPHTHALMOLOGY

## 2019-11-12 PROCEDURE — G0463 HOSPITAL OUTPT CLINIC VISIT: HCPCS | Mod: ZF

## 2019-11-12 ASSESSMENT — TONOMETRY
OS_IOP_MMHG: 14
OS_IOP_MMHG: 14
OD_IOP_MMHG: 25
OD_IOP_MMHG: 25
IOP_METHOD: TONOPEN
IOP_METHOD: TONOPEN

## 2019-11-12 ASSESSMENT — CONF VISUAL FIELD
OD_SUPERIOR_NASAL_RESTRICTION: 1
OD_INFERIOR_NASAL_RESTRICTION: 1
OD_SUPERIOR_TEMPORAL_RESTRICTION: 1
OS_NORMAL: 1
OD_INFERIOR_TEMPORAL_RESTRICTION: 1
OD_SUPERIOR_NASAL_RESTRICTION: 1
OD_SUPERIOR_TEMPORAL_RESTRICTION: 1
OD_INFERIOR_TEMPORAL_RESTRICTION: 1
OD_INFERIOR_NASAL_RESTRICTION: 1
OS_NORMAL: 1

## 2019-11-12 ASSESSMENT — SLIT LAMP EXAM - LIDS
COMMENTS: NORMAL
COMMENTS: NORMAL

## 2019-11-12 ASSESSMENT — VISUAL ACUITY
METHOD: SNELLEN - LINEAR
METHOD: SNELLEN - LINEAR
OS_SC: 20/20
OS_SC: 20/20
OD_SC: NLP
OD_SC: NLP

## 2019-11-12 ASSESSMENT — CUP TO DISC RATIO
OD_RATIO: NO VIEW
OS_RATIO: 0.1

## 2019-11-12 ASSESSMENT — PACHYMETRY
OS_CT(UM): 568
OD_CT(UM): UNABLE

## 2019-11-12 NOTE — LETTER
11/12/2019       RE: Alondra Farrar  [address]  [place] [address]     Dear Colleague,    Thank you for referring your patient, Alondra Farrar, to the EYE CLINIC at [facility]. Please see a copy of my visit note below.    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   - Monitor at this time.   - Discussed R/B/PC for removal vs. Painted contact lens for appearance vs no TX   - Awaiting official reading preliminary: No intraocular Mass noted preliminary.  RD noted on preliminary B scan.      RTC PRN.    Again, thank you for allowing me to participate in the care of your patient.      Sincerely,    Neil Mendez MD

## 2019-11-12 NOTE — PATIENT INSTRUCTIONS
Monocular precautions discussed.  Patient will continue on Timolol which is a yellow top drop in the morning in the RIGHT EYE ONLY, Alphagan (Brimonidine) which is a purple top drop 2x/day (12 hours apart) in the RIGHT EYE ONLY and Cyclopentalate 2x/day (12 hours apart) in the RIGHT EYE ONLY as previously prescribed.    Could consider trial off eye drops in the right eye in the future and restart for discomfort.  Patient will follow up with the cornea clinic for eval of cornea changes.

## 2019-11-12 NOTE — PROGRESS NOTES
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   - Monitor at this time.   - Discussed R/B/PC for removal vs. Painted contact lens   - Awaiting official reading preliminary: No intraocular Mass noted preliminary.  RD noted on preliminary B scan.      RTC PRN.    Attending Physician Attestation:  Complete documentation of historical and exam elements from today's encounter can be found in the full encounter summary report (not reduplicated in this progress note).  I personally obtained the chief complaint(s) and history of present illness.  I confirmed and edited as necessary the review of systems, past medical/surgical history, family history, social history, and examination findings as documented by others; and I examined the patient myself.  I personally reviewed the relevant tests, images, and reports as documented above.  I formulated and edited as necessary the assessment and plan and discussed the findings and management plan with the patient and family. - Neil Mendez MD
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
## 2019-12-02 ENCOUNTER — OFFICE VISIT (OUTPATIENT)
Dept: FAMILY MEDICINE | Facility: OTHER | Age: 54
End: 2019-12-02
Attending: FAMILY MEDICINE
Payer: COMMERCIAL

## 2019-12-02 VITALS
OXYGEN SATURATION: 98 % | WEIGHT: 132.8 LBS | SYSTOLIC BLOOD PRESSURE: 120 MMHG | HEART RATE: 80 BPM | DIASTOLIC BLOOD PRESSURE: 80 MMHG | BODY MASS INDEX: 22.13 KG/M2 | HEIGHT: 65 IN | RESPIRATION RATE: 16 BRPM | TEMPERATURE: 97.8 F

## 2019-12-02 DIAGNOSIS — G89.29 CHRONIC BILATERAL LOW BACK PAIN WITHOUT SCIATICA: ICD-10-CM

## 2019-12-02 DIAGNOSIS — M54.50 CHRONIC BILATERAL LOW BACK PAIN WITHOUT SCIATICA: ICD-10-CM

## 2019-12-02 DIAGNOSIS — Z12.11 ENCOUNTER FOR SCREENING FOR MALIGNANT NEOPLASM OF COLON: ICD-10-CM

## 2019-12-02 DIAGNOSIS — Z28.21 REFUSED PNEUMOCOCCAL VACCINE: ICD-10-CM

## 2019-12-02 DIAGNOSIS — Z53.20 MAMMOGRAM DECLINED: ICD-10-CM

## 2019-12-02 DIAGNOSIS — Z00.00 ANNUAL PHYSICAL EXAM: Primary | ICD-10-CM

## 2019-12-02 PROCEDURE — G0463 HOSPITAL OUTPT CLINIC VISIT: HCPCS

## 2019-12-02 PROCEDURE — 99396 PREV VISIT EST AGE 40-64: CPT | Performed by: FAMILY MEDICINE

## 2019-12-02 ASSESSMENT — PAIN SCALES - GENERAL: PAINLEVEL: MODERATE PAIN (4)

## 2019-12-02 ASSESSMENT — MIFFLIN-ST. JEOR: SCORE: 1203.26

## 2019-12-02 NOTE — PROGRESS NOTES
SUBJECTIVE:   Alondra Farrar is a 54 year old female who presents to clinic today for the following health issues:    HPI  Low Back Pain:  Chronic in nature.  No known injury or inciting event.  Present bilaterally without extension or radiation.  No associated numbness or tingling.  She reports that chiropractic adjustments help.  She does not want to participate in physical therapy at this time.    She is due for colon cancer screening.  She refuses colonoscopy but is willing to do the Cologuard.  She denies abdominal pain, constipation, and blood in or with her bowel movements.    She is due for breast cancer screening.  She declines mammogram testing.    She is eligible for pneumococcal vaccination due to her smoking history.  She declines today but will consider in the future.    Past Medical History:   Diagnosis Date     Encounter for full-term uncomplicated delivery       x 4      Past Surgical History:   Procedure Laterality Date     APPENDECTOMY OPEN      No Comments Provided     CATARACT IOL, RT/LT Right      EXTRACAPSULAR CATARACT EXTRATION WITH INTRAOCULAR LENS IMPLANT      Cataract surgery as a teen     LAPAROSCOPIC TUBAL LIGATION      No Comments Provided     Family History   Problem Relation Age of Onset     Substance Abuse Mother         Alcohol/Drug,  age 58 due to cirrhosis     Heart Disease Father         Heart Disease,MI in his 60's     Macular Degeneration Father      Family History Negative Other         Good Health,Siblings healthy     Glaucoma No family hx of      Retinal detachment No family hx of      Social History     Tobacco Use     Smoking status: Current Every Day Smoker     Packs/day: 1.00     Types: Cigarettes     Smokeless tobacco: Never Used   Substance Use Topics     Alcohol use: Yes     Comment: Alcoholic Drinks/day: occ     Current Outpatient Medications   Medication Sig Dispense Refill     brimonidine (ALPHAGAN) 0.2 % ophthalmic solution 1 drop 3 times daily   "     cyclopentolate (CYCLOGYL) 1 % ophthalmic solution 1 drop 1 time for 1 dose       timolol (TIMOPTIC) 0.5 % ophthalmic solution 2 times daily.       Allergies   Allergen Reactions     Tetracycline Itching       Review of Systems   Constitutional: Negative for chills and fever.   HENT: Negative for congestion, ear pain, rhinorrhea and sore throat.    Respiratory: Negative for shortness of breath.    Cardiovascular: Negative for chest pain.   Gastrointestinal: Negative for abdominal pain, constipation and hematochezia.   Genitourinary: Negative for dysuria.      OBJECTIVE:     /80   Pulse 80   Temp 97.8  F (36.6  C) (Temporal)   Resp 16   Ht 1.651 m (5' 5\")   Wt 60.2 kg (132 lb 12.8 oz)   SpO2 98%   BMI 22.10 kg/m    Body mass index is 22.1 kg/m .  Physical Exam  Constitutional:       Appearance: Normal appearance.   HENT:      Head: Normocephalic and atraumatic.      Right Ear: Tympanic membrane normal.      Left Ear: Tympanic membrane normal.      Nose: No congestion or rhinorrhea.      Mouth/Throat:      Mouth: Mucous membranes are moist.      Pharynx: Oropharynx is clear. No oropharyngeal exudate or posterior oropharyngeal erythema.   Eyes:      General:         Right eye: No discharge.         Left eye: No discharge.   Cardiovascular:      Rate and Rhythm: Normal rate and regular rhythm.   Pulmonary:      Effort: Pulmonary effort is normal.      Breath sounds: Normal breath sounds.   Abdominal:      General: Bowel sounds are normal.      Palpations: Abdomen is soft.      Tenderness: There is no abdominal tenderness. There is no guarding or rebound.   Musculoskeletal:         General: No deformity.   Lymphadenopathy:      Cervical: No cervical adenopathy.   Neurological:      Mental Status: She is alert.   Psychiatric:         Mood and Affect: Mood normal.         ASSESSMENT/PLAN:     1. Annual physical exam  54 year-old female with past medical history of pseudophakia of right eye, glaucoma of " right eye, musculoskeletal pain, and tobacco abuse presents for annual physical.  - No ASA therapy.  - ASCVD 10-year risk 3.6% based on most recent lipid panel so statin therapy not indicated.  - BP at goal < 130/80.  - No laboratory monitoring indicated.  - Due for colon cancer screening.  Cologuard ordered.  - Declines breast cancer screening with mammogram despite discussion of risks/benefits.  - Cervical cancer screening current.  - Declines influenza and pneumococcal vaccination.  Will reconsider in the future.  - Discussed healthy diet and exercise.  - Encouraged smoking cessation.  - No depression.    2. Chronic bilateral low back pain without sciatica  Encouraged continued chiropractic manipulations as well as strengthening and stretching exercises.  She is avoiding NSAIDs due to bleeding within her damaged right eye.  Discussed use of ice and heat therapy as needed.    3. Encounter for screening for malignant neoplasm of colon  - ABSTRACT COLOGUARD-NO CHARGE    4. Refused pneumococcal vaccine    5. Mammogram declined      Cielo Vee DO  [facility]

## 2019-12-02 NOTE — NURSING NOTE
"Chief Complaint   Patient presents with     Physical     Refuses Colonoscopy and Mammogram.    Initial /80   Pulse 80   Temp 97.8  F (36.6  C) (Temporal)   Resp 16   Ht 1.651 m (5' 5\")   Wt 60.2 kg (132 lb 12.8 oz)   SpO2 98%   BMI 22.10 kg/m   Estimated body mass index is 22.1 kg/m  as calculated from the following:    Height as of this encounter: 1.651 m (5' 5\").    Weight as of this encounter: 60.2 kg (132 lb 12.8 oz).    Medication Reconciliation: complete      Norma J. Gosselin, LPN  "

## 2019-12-04 ASSESSMENT — ENCOUNTER SYMPTOMS
FEVER: 0
SHORTNESS OF BREATH: 0
HEMATOCHEZIA: 0
DYSURIA: 0
RHINORRHEA: 0
ABDOMINAL PAIN: 0
SORE THROAT: 0
CHILLS: 0
CONSTIPATION: 0

## 2020-03-11 ENCOUNTER — HEALTH MAINTENANCE LETTER (OUTPATIENT)
Age: 55
End: 2020-03-11

## 2020-07-06 ENCOUNTER — NURSE TRIAGE (OUTPATIENT)
Dept: FAMILY MEDICINE | Facility: OTHER | Age: 55
End: 2020-07-06

## 2020-07-06 NOTE — TELEPHONE ENCOUNTER
"S-(situation): Patient called to report chest pain.    B-(background):   Pt has crushing chest pain that started 2 hours ago, pt stated it is not a \"crushing\" feeling now but it still hurts and she feels very fatigued.    A-(assessment):   Chest pain greater than 5 mins, described as \"crushing\" no cardiac Hx, feels fatigued.      R-(recommendations): Recommendation call 911, go to ER now, can not be seen in clinic. Needs immediate evaluation. Patient stated she did not want to call the ambulance. Told patient the recommendation was that she call the ambulance because it was a long drive from [place] and she needed ER evaluation. Pt stated she would go but she would drive. Pt has no other . Tanya Mijares RN .............. 7/6/2020  3:24 PM        Reason for Disposition    [1] Chest pain lasts > 5 minutes AND [2] described as crushing, pressure-like, or heavy    [1] Chest pain lasts > 5 minutes AND [2] age > 50    Additional Information    Negative: Severe difficulty breathing (e.g., struggling for each breath, speaks in single words)    Negative: Difficult to awaken or acting confused (e.g., disoriented, slurred speech)    Negative: Shock suspected (e.g., cold/pale/clammy skin, too weak to stand, low BP, rapid pulse)    Negative: [1] Chest pain lasts > 5 minutes AND [2] history of heart disease  (i.e., heart attack, bypass surgery, angina, angioplasty, CHF; not just a heart murmur)    Protocols used: CHEST PAIN-A-AH      "

## 2020-11-13 ENCOUNTER — NURSE TRIAGE (OUTPATIENT)
Dept: NURSING | Facility: CLINIC | Age: 55
End: 2020-11-13

## 2020-11-13 ENCOUNTER — HOSPITAL ENCOUNTER (EMERGENCY)
Facility: OTHER | Age: 55
Discharge: HOME | End: 2020-11-13
Attending: FAMILY MEDICINE | Admitting: FAMILY MEDICINE
Payer: MEDICAID

## 2020-11-13 ENCOUNTER — APPOINTMENT (OUTPATIENT)
Dept: GENERAL RADIOLOGY | Facility: OTHER | Age: 55
End: 2020-11-13
Attending: FAMILY MEDICINE
Payer: MEDICAID

## 2020-11-13 VITALS
OXYGEN SATURATION: 96 % | WEIGHT: 127 LBS | HEIGHT: 66 IN | SYSTOLIC BLOOD PRESSURE: 166 MMHG | RESPIRATION RATE: 21 BRPM | BODY MASS INDEX: 20.41 KG/M2 | TEMPERATURE: 96 F | HEART RATE: 72 BPM | DIASTOLIC BLOOD PRESSURE: 96 MMHG

## 2020-11-13 DIAGNOSIS — R05.3 CHRONIC COUGH: ICD-10-CM

## 2020-11-13 DIAGNOSIS — R07.9 LEFT-SIDED CHEST PAIN: ICD-10-CM

## 2020-11-13 LAB
ALBUMIN SERPL-MCNC: 4 G/DL (ref 3.5–5.7)
ALP SERPL-CCNC: 58 U/L (ref 34–104)
ALT SERPL W P-5'-P-CCNC: 14 U/L (ref 7–52)
ANION GAP SERPL CALCULATED.3IONS-SCNC: 9 MMOL/L (ref 3–14)
AST SERPL W P-5'-P-CCNC: 21 U/L (ref 13–39)
BASOPHILS # BLD AUTO: 0.1 10E9/L (ref 0–0.2)
BASOPHILS NFR BLD AUTO: 0.9 %
BILIRUB SERPL-MCNC: 1.1 MG/DL (ref 0.3–1)
BUN SERPL-MCNC: 10 MG/DL (ref 7–25)
CALCIUM SERPL-MCNC: 9.1 MG/DL (ref 8.6–10.3)
CHLORIDE SERPL-SCNC: 103 MMOL/L (ref 98–107)
CO2 SERPL-SCNC: 28 MMOL/L (ref 21–31)
CREAT SERPL-MCNC: 0.56 MG/DL (ref 0.6–1.2)
DIFFERENTIAL METHOD BLD: ABNORMAL
EOSINOPHIL # BLD AUTO: 0.1 10E9/L (ref 0–0.7)
EOSINOPHIL NFR BLD AUTO: 1.2 %
ERYTHROCYTE [DISTWIDTH] IN BLOOD BY AUTOMATED COUNT: 13.7 % (ref 10–15)
GFR SERPL CREATININE-BSD FRML MDRD: >90 ML/MIN/{1.73_M2}
GLUCOSE SERPL-MCNC: 107 MG/DL (ref 70–105)
HCT VFR BLD AUTO: 38.8 % (ref 35–47)
HGB BLD-MCNC: 13.4 G/DL (ref 11.7–15.7)
IMM GRANULOCYTES # BLD: 0 10E9/L (ref 0–0.4)
IMM GRANULOCYTES NFR BLD: 0.3 %
INR PPP: 0.96 (ref 0.86–1.14)
LYMPHOCYTES # BLD AUTO: 1.5 10E9/L (ref 0.8–5.3)
LYMPHOCYTES NFR BLD AUTO: 25.3 %
MCH RBC QN AUTO: 33.2 PG (ref 26.5–33)
MCHC RBC AUTO-ENTMCNC: 34.5 G/DL (ref 31.5–36.5)
MCV RBC AUTO: 96 FL (ref 78–100)
MONOCYTES # BLD AUTO: 0.5 10E9/L (ref 0–1.3)
MONOCYTES NFR BLD AUTO: 9.2 %
NEUTROPHILS # BLD AUTO: 3.6 10E9/L (ref 1.6–8.3)
NEUTROPHILS NFR BLD AUTO: 63.1 %
PLATELET # BLD AUTO: 182 10E9/L (ref 150–450)
POTASSIUM SERPL-SCNC: 3.3 MMOL/L (ref 3.5–5.1)
PROT SERPL-MCNC: 6.6 G/DL (ref 6.4–8.9)
RBC # BLD AUTO: 4.04 10E12/L (ref 3.8–5.2)
SODIUM SERPL-SCNC: 140 MMOL/L (ref 134–144)
TROPONIN I SERPL-MCNC: 2.9 PG/ML
TROPONIN I SERPL-MCNC: 3.1 PG/ML
WBC # BLD AUTO: 5.7 10E9/L (ref 4–11)

## 2020-11-13 PROCEDURE — 85025 COMPLETE CBC W/AUTO DIFF WBC: CPT | Performed by: FAMILY MEDICINE

## 2020-11-13 PROCEDURE — 80053 COMPREHEN METABOLIC PANEL: CPT | Performed by: FAMILY MEDICINE

## 2020-11-13 PROCEDURE — 85610 PROTHROMBIN TIME: CPT | Performed by: FAMILY MEDICINE

## 2020-11-13 PROCEDURE — 84484 ASSAY OF TROPONIN QUANT: CPT | Performed by: FAMILY MEDICINE

## 2020-11-13 PROCEDURE — 99285 EMERGENCY DEPT VISIT HI MDM: CPT | Mod: 25 | Performed by: FAMILY MEDICINE

## 2020-11-13 PROCEDURE — 71045 X-RAY EXAM CHEST 1 VIEW: CPT

## 2020-11-13 PROCEDURE — 36415 COLL VENOUS BLD VENIPUNCTURE: CPT | Performed by: FAMILY MEDICINE

## 2020-11-13 PROCEDURE — 250N000009 HC RX 250: Performed by: FAMILY MEDICINE

## 2020-11-13 PROCEDURE — 93010 ELECTROCARDIOGRAM REPORT: CPT | Mod: 76 | Performed by: INTERNAL MEDICINE

## 2020-11-13 PROCEDURE — 93005 ELECTROCARDIOGRAM TRACING: CPT | Performed by: FAMILY MEDICINE

## 2020-11-13 PROCEDURE — 99284 EMERGENCY DEPT VISIT MOD MDM: CPT | Performed by: FAMILY MEDICINE

## 2020-11-13 RX ORDER — AZITHROMYCIN 250 MG/1
TABLET, FILM COATED ORAL
Qty: 6 TABLET | Refills: 0 | Status: SHIPPED | OUTPATIENT
Start: 2020-11-13 | End: 2020-11-18

## 2020-11-13 RX ORDER — ALBUTEROL SULFATE 90 UG/1
2 AEROSOL, METERED RESPIRATORY (INHALATION) EVERY 6 HOURS PRN
Qty: 1 INHALER | Refills: 0 | Status: SHIPPED | OUTPATIENT
Start: 2020-11-13 | End: 2020-12-10

## 2020-11-13 RX ORDER — DEXAMETHASONE SODIUM PHOSPHATE 10 MG/ML
10 INJECTION, SOLUTION INTRAMUSCULAR; INTRAVENOUS ONCE
Status: COMPLETED | OUTPATIENT
Start: 2020-11-13 | End: 2020-11-13

## 2020-11-13 RX ADMIN — DEXAMETHASONE SODIUM PHOSPHATE 10 MG: 10 INJECTION, SOLUTION INTRAMUSCULAR; INTRAVENOUS at 13:20

## 2020-11-13 ASSESSMENT — MIFFLIN-ST. JEOR: SCORE: 1187.82

## 2020-11-13 NOTE — TELEPHONE ENCOUNTER
"\"I think I'm getting pneumonia\".    Left side chest pain.  Difficulty taking a deep breath.    Has had two negative resulted covid tests in the past two weeks.    O2 sat this a.m. around 3:00, when she got to work, was 92%.  She's usually in the 97%-98% range.    Pain started last weekend 11/7/20 and has progressively been getting worse. Yesterday was significantly worse.    Was able to speak in full sentences.  Per the protocol, I instructed 911.  Alondra said she isn't going to do that but instead will have someone  her to [facility]'s ER. She said she's about 25 minutes away.      Questions answered.    COVID 19 Nurse Triage Plan/Patient Instructions    Please be aware that novel coronavirus (COVID-19) may be circulating in the community. If you develop symptoms such as fever, cough, or SOB or if you have concerns about the presence of another infection including coronavirus (COVID-19), please contact your health care provider or visit [email or web address].     Disposition/Instructions    Call to EMS/911 recommended. Follow protocol based instructions.     Bring Your Own Device:  Please also bring your smart device(s) (smart phones, tablets, laptops) and their charging cables for your personal use and to communicate with your care team during your visit.    Thank you for taking steps to prevent the spread of this virus.  o Limit your contact with others.  o Wear a simple mask to cover your cough.  o Wash your hands well and often.    Resources    [facility]: About COVID-19: [email or web address]    CDC: What to Do If You're Sick: www.cdc.gov/coronavirus/2019-ncov/about/steps-when-sick.html    CDC: Ending Home Isolation: www.cdc.gov/coronavirus/2019-ncov/hcp/disposition-in-home-patients.html     CDC: Caring for Someone: www.cdc.gov/coronavirus/2019-ncov/if-you-are-sick/care-for-someone.html     MD: Interim Guidance for Hospital Discharge to Home: " www.health.[email or web address].[place]/diseases/[email or web address]/hcp/hospdischarge.pdf    [facility] clinical trials (COVID-19 research studies): [email or web address].[facility]/umn-[email or web address]-trials     Below are the COVID-19 hotlines at the [place] Department of Health ([facility]). Interpreters are available.   o For health questions: Call 278-449-0656 or 1-865.511.9002 (7 a.m. to 7 p.m.)  o For questions about schools and childcare: Call 039-473-6217 or 1-183.413.3079 (7 a.m. to 7 p.m.)       Reason for Disposition    [1] Chest pain lasts > 5 minutes AND [2] age > 50    Additional Information    Negative: Severe difficulty breathing (e.g., struggling for each breath, speaks in single words)    Negative: Difficult to awaken or acting confused (e.g., disoriented, slurred speech)    Negative: Shock suspected (e.g., cold/pale/clammy skin, too weak to stand, low BP, rapid pulse)    Negative: [1] Chest pain lasts > 5 minutes AND [2] history of heart disease  (i.e., heart attack, bypass surgery, angina, angioplasty, CHF; not just a heart murmur)    Negative: [1] Chest pain lasts > 5 minutes AND [2] described as crushing, pressure-like, or heavy    Protocols used: CHEST PAIN-A-AH    Rula SIFUENTES RN [facility]

## 2020-11-13 NOTE — ED AVS SNAPSHOT
[facility]  [address]  [place] [address]  Phone: [phone]  Fax: [phone]                                    Alondra Farrar   MRN: [id number]    Department: [facility]   Date of Visit: 11/13/2020           After Visit Summary Signature Page    I have received my discharge instructions, and my questions have been answered. I have discussed any challenges I see with this plan with the nurse or doctor.    ..........................................................................................................................................  Patient/Patient Representative Signature      ..........................................................................................................................................  Patient Representative Print Name and Relationship to Patient    ..................................................               ................................................  Date                                   Time    ..........................................................................................................................................  Reviewed by Signature/Title    ...................................................              ..............................................  Date                                               Time          22EPIC Rev 08/18

## 2020-11-13 NOTE — DISCHARGE INSTRUCTIONS
Mac Farrar,    It was a pleasure working with you today. As we discussed we do not believe your pain is coming from your heart.   We recommend:  Rest and ice (4x/day for 20 min) - no heavy lifting  1000 mg tylenol 4x/day and 400 mg ibuprofen 3x/day as needed for pain  Please follow up with your primary care provider and discuss need for pulmonary function testing for COPD  Please use albuterol inhaler every 4 hours while awake and experiencing symptoms    Please come back to the ED if you develop severe persistent pain, shortness of breath, or weakness/dizziness.    Yanique Schmid MS3

## 2020-11-13 NOTE — ED TRIAGE NOTES
Patient presents to the ER via private car from work. Patient states she has been having chest pain & lung pain on her left breast that started Sunday. She has a prod cough and feels SOB. She thinks she has pneumonia. Rating chest pain 4 out of 10.

## 2020-11-13 NOTE — ED PROVIDER NOTES
[facility]  Emergency Department Visit Note    Chest Pain      History of Present Illness     Alondra Farrar is a 55 year old female presenting with chest pain. This pain started yesterday prior to arrival and the onset was while coughing. The pain is characterized as 2/10 resting but 8-9/10 while coughing, does radiate to left shoulder and around left side of chest, and is associated with cough and nauseas, is not associated with fevers, vomiting, diaphoresis, change with respiration. Pain increases with deep breathing, but patient is not laboring to breath. Says gets SOB while walking since yesterday.The patient has not had similar symptoms in the past. No falls or other recent injuries. Dad has had a heart attack, no other pertinent family history or personal history. No recent travel.    Medications:  Prior to Admission medications    Medication Sig Last Dose Taking? Auth Provider   brimonidine (ALPHAGAN) 0.2 % ophthalmic solution 1 drop 3 times daily 11/13/2020 at Unknown time Yes Reported, Patient   cyclopentolate (CYCLOGYL) 1 % ophthalmic solution 1 drop 1 time for 1 dose 11/13/2020 at Unknown time Yes Reported, Patient   timolol (TIMOPTIC) 0.5 % ophthalmic solution 2 times daily. 11/13/2020 at Unknown time Yes Reported, Patient       Allergies:  Allergies   Allergen Reactions     Tetracycline Itching       Problem List:  Patient Active Problem List   Diagnosis     Acne rosacea     Alcoholism (H)     Condyloma acuminata     Other specified glaucoma     Encounter for screening for malignant neoplasm of colon     Skin lesion     Profound impairment, one eye, impairment level not further specified     Sprain of lumbar region     Sprain of neck     Sprain of thoracic region     Tobacco use disorder     Secondary glaucoma of right eye, severe stage     Pseudophakia of right eye       Past Medical History:  Past Medical History:   Diagnosis Date     Encounter for full-term uncomplicated  "delivery       x 4       Past Surgical History:  Past Surgical History:   Procedure Laterality Date     APPENDECTOMY OPEN      No Comments Provided     CATARACT IOL, RT/LT Right      EXTRACAPSULAR CATARACT EXTRATION WITH INTRAOCULAR LENS IMPLANT      Cataract surgery as a teen     LAPAROSCOPIC TUBAL LIGATION      No Comments Provided       Social History:  Social History     Tobacco Use     Smoking status: Current Every Day Smoker     Packs/day: 1.00     Types: Cigarettes     Smokeless tobacco: Never Used   Substance Use Topics     Alcohol use: Yes     Comment: once a week     Drug use: Never       Review of Systems:  10 point review of systems obtained and pertinent positive and negative findings noted in HPI. Review of systems otherwise negative.      Physical Exam     Vital signs: BP (!) 186/94   Pulse 81   Temp 96  F (35.6  C) (Tympanic)   Resp 20   Ht 1.676 m (5' 6\")   Wt 57.6 kg (127 lb)   SpO2 95%   BMI 20.50 kg/m      Physical Exam:  General: awake and alert, comfortable  HEENT: atraumatic, no scleral injection, no nasal discharge, neck supple  Chest wall: palpation of the chest wall produces tenderness  Chest: clear to auscultation bilaterally without wheezes or crackles, non labored respirations  Cardiovascular: regular rate and rhythm, no murmurs or gallops  Abdomen: soft, nontender, no rebound or guarding, nondistended  Extremities: no deformities, edema, or tenderness  Skin: warm, dry, no rashes  Neuro: alert and oriented x 3, moving extremities x 4, ambulates without difficulty    Medical Decision Making & ED Course     Alondra Farrar is a 55 year old female presenting with chest pain. Differential includes acute coronary syndrome, pulmonary embolism, aortic dissection, pneumonia, esophageal spasm, gastroesophageal reflux, reactive airway disease, chest wall pain, pericarditis, pleuritis, COVID infection, COPD exacerbation. Concern for a life threatening etiology of symptoms prompts " EKG, CXR, and laboratory evaluation. Clear CXR makes pneumonia, COVID, and rib fracture less likely. EKG and a serial trop in normal range, makes a cardiac disease less likely. HEART score is low and Well's score for PE is low, indicating no further work up for ACS or PE is indicated at this time.     On further examination, her cough is chronic with recently increased sputum, and it seems to be more of a musculoskeletal pain with possible COPD exacerbation.    For the pain recommend, ice/heat, 1000mg tylenol 4x./day and 400mg ibuprofen 3x/day as needed for pain.    For the cough and sputum production, indicating possible undiagnosed COPD exacerbation, gave decadron 10mg in ED, prescribed azithromycin and albuterol inhaler for exacerbations.      HEART Score  Background  Calculates the overall risk of adverse event in patient's presenting with chest pain.  Based on 5 criteria (each assigned 0-2 points) including suspiciousness of history, EKG, age, risk factors and troponin.    Data  55 year old female  has Acne rosacea; Alcoholism (H); Condyloma acuminata; Other specified glaucoma; Encounter for screening for malignant neoplasm of colon; Skin lesion; Profound impairment, one eye, impairment level not further specified; Sprain of lumbar region; Sprain of neck; Sprain of thoracic region; Tobacco use disorder; Secondary glaucoma of right eye, severe stage; and Pseudophakia of right eye on their problem list.   reports that she has been smoking cigarettes. She has been smoking about 1.00 pack per day. She has never used smokeless tobacco.  family history includes Family History Negative in an other family member; Heart Disease in her father; Macular Degeneration in her father; Substance Abuse in her mother.  No results found for: TROPI  Criteria   0-2 points for each of 5 items (maximum of 10 points):  Score 0- History slightly suspicious for coronary syndrome  Score 0- EKG Normal  Score 1- Age 45 to 65 years  old  Score 1- One to 2 risk factors for atherosclerotic disease  Score 0- Within normal limits for troponin levels  Interpretation  Risk of adverse outcome  Heart Score: 2  Total Score 0-3- Adverse Outcome Risk 2.5% - Supports early discharge with appropriate follow-up    WELLS PE SCORE for Pulmonary Embolism likelihood (calculator)  Background  Calculates likelihood of PE based on 7 criteria including suspected DVT, HR>100, recent surgery or immobilization, prior VTE, hemoptysis, active cancer.  Data  has Acne rosacea; Alcoholism (H); Condyloma acuminata; Other specified glaucoma; Encounter for screening for malignant neoplasm of colon; Skin lesion; Profound impairment, one eye, impairment level not further specified; Sprain of lumbar region; Sprain of neck; Sprain of thoracic region; Tobacco use disorder; Secondary glaucoma of right eye, severe stage; and Pseudophakia of right eye on their problem list.   has a past surgical history that includes Extracapsular cataract extration with intraocular lens implant; Laparoscopic tubal ligation; Appendectomy open; and cataract iol, rt/lt (Right).  Pulse: 75  Criteria   Of possible 12.5 points for 7 possible items:  NEGATIVE  Interpretation  Wells PE Score: 0  Score 0-2 points: Low PE probability (3.6% risk)      Diagnosis & Disposition     Diagnosis:  Chest pain    Yanique Sharmaine MS3  [facility]    CONTINUATION OF CARE      Note started by MS3 RPAP,   I revised, edited and addended note as needed.  In addition patient was seen and examined by myself including both history and physical examination. My findings are reflected in the note above.             Dion Griffin MD  12/01/20 [id number]

## 2020-11-17 LAB — INTERPRETATION ECG - MUSE: NORMAL

## 2020-12-10 ENCOUNTER — OFFICE VISIT (OUTPATIENT)
Dept: FAMILY MEDICINE | Facility: OTHER | Age: 55
End: 2020-12-10
Attending: FAMILY MEDICINE
Payer: MEDICAID

## 2020-12-10 ENCOUNTER — HOSPITAL ENCOUNTER (OUTPATIENT)
Dept: GENERAL RADIOLOGY | Facility: OTHER | Age: 55
End: 2020-12-10
Attending: FAMILY MEDICINE
Payer: MEDICAID

## 2020-12-10 VITALS
WEIGHT: 127.4 LBS | RESPIRATION RATE: 20 BRPM | HEART RATE: 88 BPM | BODY MASS INDEX: 20.56 KG/M2 | SYSTOLIC BLOOD PRESSURE: 118 MMHG | DIASTOLIC BLOOD PRESSURE: 84 MMHG | TEMPERATURE: 97.5 F

## 2020-12-10 DIAGNOSIS — R06.2 WHEEZING: Primary | ICD-10-CM

## 2020-12-10 DIAGNOSIS — R06.2 WHEEZING: ICD-10-CM

## 2020-12-10 DIAGNOSIS — F17.200 SMOKER: ICD-10-CM

## 2020-12-10 DIAGNOSIS — S22.43XD MULTIPLE CLOSED FRACTURES OF RIBS OF BOTH SIDES WITH ROUTINE HEALING, SUBSEQUENT ENCOUNTER: ICD-10-CM

## 2020-12-10 PROCEDURE — 71046 X-RAY EXAM CHEST 2 VIEWS: CPT

## 2020-12-10 PROCEDURE — 99214 OFFICE O/P EST MOD 30 MIN: CPT | Performed by: FAMILY MEDICINE

## 2020-12-10 PROCEDURE — G0463 HOSPITAL OUTPT CLINIC VISIT: HCPCS | Performed by: FAMILY MEDICINE

## 2020-12-10 RX ORDER — PREDNISONE 10 MG/1
TABLET ORAL
Qty: 12 TABLET | Refills: 0 | Status: SHIPPED | OUTPATIENT
Start: 2020-12-10

## 2020-12-10 RX ORDER — ALBUTEROL SULFATE 90 UG/1
2 AEROSOL, METERED RESPIRATORY (INHALATION) EVERY 6 HOURS PRN
Qty: 1 INHALER | Refills: 0 | Status: SHIPPED | OUTPATIENT
Start: 2020-12-10

## 2020-12-10 ASSESSMENT — ENCOUNTER SYMPTOMS
CHOKING: 0
SLEEP DISTURBANCE: 0
COUGH: 1
NECK PAIN: 0
BACK PAIN: 0
UNEXPECTED WEIGHT CHANGE: 0
FEVER: 0
FATIGUE: 0
NECK STIFFNESS: 0
CHEST TIGHTNESS: 0
APNEA: 0
HEADACHES: 0
WHEEZING: 1

## 2020-12-10 ASSESSMENT — PAIN SCALES - GENERAL: PAINLEVEL: MILD PAIN (2)

## 2020-12-10 NOTE — LETTER
December 10, 2020      Alondra Farrar  [facility]  [place] [address]        To Whom It May Concern:    Alondra Farrar was seen in our clinic. Patient has multiple broken ribs and should remain on light duty until able to perform without pain.    Sincerely,        Mireille Ferreira MD

## 2020-12-10 NOTE — PROGRESS NOTES
SUBJECTIVE:   Alondra Farrar is a 55 year old female who presents to clinic today for the following health issues:  There are no exam notes on file for this visit.      HPI    Pleasant 55-year-old female presents today for recheck after recent ER visit and hospitalization.  She reports falling onto a hard wooden dollhouse and sustaining multiple rib fractures.  She was transferred from [place] to [place].  I do not have complete records to review.  Patient admits to drinking too much on the night of her fall.  She does not feel she has an alcohol problem.    She continues to have mild chest wall discomfort with twisting and reaching above her head.  She denies shortness of breath.  She does have mild sputum production and wheezing.  She is uncertain if she has underlying lung disease but is a chronic smoker.  She was given an albuterol inhaler during her last visit and has not used that yet.    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  She is on light duty and needs a letter for her employer.  Patient Active Problem List    Diagnosis Date Noted     Secondary glaucoma of right eye, severe stage 11/12/2019     Priority: Medium     Pseudophakia of right eye 11/12/2019     Priority: Medium     Condyloma acuminata 04/12/2012     Priority: Medium     Acne rosacea 04/05/2012     Priority: Medium     Alcoholism (H) 04/05/2012     Priority: Medium     Other specified glaucoma 04/05/2012     Priority: Medium     Overview:   onset age [age]       Encounter for screening for malignant neoplasm of colon 04/05/2012     Priority: Medium     Skin lesion 04/05/2012     Priority: Medium     Sprain of lumbar region 12/29/2006     Priority: Medium     Overview:   IMO Update 10/11       Sprain of neck 12/29/2006     Priority: Medium     Overview:   IMO Update 10/11       Sprain of thoracic region 12/29/2006     Priority: Medium     Overview:   IMO Update 10/11       Tobacco use disorder 12/01/2006     Priority:  Medium     Profound impairment, one eye, impairment level not further specified 10/27/2004     Priority: Medium     Overview:   right eye       Past Medical History:   Diagnosis Date     Encounter for full-term uncomplicated delivery       x 4      Current Outpatient Medications   Medication Sig Dispense Refill     albuterol (PROAIR HFA/PROVENTIL HFA/VENTOLIN HFA) 108 (90 Base) MCG/ACT inhaler Inhale 2 puffs into the lungs every 6 hours as needed for shortness of breath / dyspnea or wheezing 1 Inhaler 0     predniSONE (DELTASONE) 10 MG tablet 3 tabs daily x 4 days 12 tablet 0     brimonidine (ALPHAGAN) 0.2 % ophthalmic solution 1 drop 3 times daily       cyclopentolate (CYCLOGYL) 1 % ophthalmic solution 1 drop 1 time for 1 dose       timolol (TIMOPTIC) 0.5 % ophthalmic solution 2 times daily.         Review of Systems   Constitutional: Negative for fatigue, fever and unexpected weight change.   Respiratory: Positive for cough and wheezing. Negative for apnea, choking and chest tightness.    Cardiovascular: Negative for chest pain and peripheral edema.   Musculoskeletal: Negative for back pain, neck pain and neck stiffness.   Neurological: Negative for headaches.   Psychiatric/Behavioral: Negative for mood changes and sleep disturbance.        OBJECTIVE:     /84 (BP Location: Right arm, Patient Position: Sitting, Cuff Size: Adult Regular)   Pulse 88   Temp 97.5  F (36.4  C) (Tympanic)   Resp 20   Wt 57.8 kg (127 lb 6.4 oz)   Breastfeeding No   BMI 20.56 kg/m    Body mass index is 20.56 kg/m .  Physical Exam  HENT:      Mouth/Throat:      Mouth: Mucous membranes are moist.      Pharynx: Oropharynx is clear.   Neck:      Musculoskeletal: Normal range of motion. No neck rigidity.   Cardiovascular:      Rate and Rhythm: Normal rate.      Heart sounds: No murmur.   Pulmonary:      Effort: Pulmonary effort is normal.      Breath sounds: No stridor. Wheezing present. No rhonchi.   Chest:      Chest wall:  Tenderness present.   Neurological:      Mental Status: She is alert.   Psychiatric:         Mood and Affect: Mood normal.         Diagnostic Test Results:  No results found for this or any previous visit (from the past 24 hour(s)).    ASSESSMENT/PLAN:           ICD-10-CM    1. Wheezing  R06.2 albuterol (PROAIR HFA/PROVENTIL HFA/VENTOLIN HFA) 108 (90 Base) MCG/ACT inhaler     XR Chest 2 Views     predniSONE (DELTASONE) 10 MG tablet   2. Multiple closed fractures of ribs of both sides with routine healing, subsequent encounter  S22.43XD    3. Smoker  F17.200        Repeat chest x-ray is normal.  I am not able to visualize the rib fractures but on physical exam has no focal tenderness.  She still having some mild chest wall discomfort with range of motion and recommend she continue on light duty until she is able to reach above her head without discomfort.    Suspect she has underlying COPD.  Discussed starting on inhaled steroid for ongoing cough and wheezing but given lack of insurance, decided on a prednisone burst.  She will continue albuterol as needed.    Recommend she have an annual CT scan for lung cancer screening.    Write a letter for her for her employer.  I spent over 25 minutes with the patient, greater than 50% was spent in counseling and coordination of care.    Mireille Ferreira MD  [facility]

## 2020-12-27 ENCOUNTER — HEALTH MAINTENANCE LETTER (OUTPATIENT)
Age: 55
End: 2020-12-27

## 2021-01-15 ENCOUNTER — HEALTH MAINTENANCE LETTER (OUTPATIENT)
Age: 56
End: 2021-01-15

## 2021-10-09 ENCOUNTER — HEALTH MAINTENANCE LETTER (OUTPATIENT)
Age: 56
End: 2021-10-09

## 2022-01-01 ENCOUNTER — HEALTH MAINTENANCE LETTER (OUTPATIENT)
Age: 57
End: 2022-01-01

## (undated) RX ORDER — DEXAMETHASONE SODIUM PHOSPHATE 10 MG/ML
INJECTION, SOLUTION INTRAMUSCULAR; INTRAVENOUS
Status: DISPENSED
Start: 2020-11-13